# Patient Record
Sex: MALE | Race: WHITE | HISPANIC OR LATINO | Employment: OTHER | URBAN - METROPOLITAN AREA
[De-identification: names, ages, dates, MRNs, and addresses within clinical notes are randomized per-mention and may not be internally consistent; named-entity substitution may affect disease eponyms.]

---

## 2017-06-17 ENCOUNTER — ALLSCRIPTS OFFICE VISIT (OUTPATIENT)
Dept: OTHER | Facility: OTHER | Age: 57
End: 2017-06-17

## 2017-06-17 ENCOUNTER — APPOINTMENT (OUTPATIENT)
Dept: LAB | Facility: CLINIC | Age: 57
End: 2017-06-17
Payer: COMMERCIAL

## 2017-06-17 DIAGNOSIS — Z12.5 ENCOUNTER FOR SCREENING FOR MALIGNANT NEOPLASM OF PROSTATE: ICD-10-CM

## 2017-06-17 DIAGNOSIS — Z87.19 PERSONAL HISTORY OF OTHER DISEASES OF THE DIGESTIVE SYSTEM: ICD-10-CM

## 2017-06-17 DIAGNOSIS — Z12.11 ENCOUNTER FOR SCREENING FOR MALIGNANT NEOPLASM OF COLON: ICD-10-CM

## 2017-06-17 LAB — PSA SERPL-MCNC: 1.2 NG/ML (ref 0–4)

## 2017-06-17 PROCEDURE — 86430 RHEUMATOID FACTOR TEST QUAL: CPT

## 2017-06-17 PROCEDURE — 36415 COLL VENOUS BLD VENIPUNCTURE: CPT

## 2017-06-17 PROCEDURE — 86235 NUCLEAR ANTIGEN ANTIBODY: CPT

## 2017-06-17 PROCEDURE — 86038 ANTINUCLEAR ANTIBODIES: CPT

## 2017-06-17 PROCEDURE — G0103 PSA SCREENING: HCPCS

## 2017-06-19 LAB
ENA SS-A AB SER-ACNC: <0.2 AI (ref 0–0.9)
ENA SS-B AB SER-ACNC: <0.2 AI (ref 0–0.9)
RHEUMATOID FACT SER QL LA: NEGATIVE
RYE IGE QN: NEGATIVE

## 2017-07-15 ENCOUNTER — ALLSCRIPTS OFFICE VISIT (OUTPATIENT)
Dept: OTHER | Facility: OTHER | Age: 57
End: 2017-07-15

## 2018-01-13 VITALS
TEMPERATURE: 98.2 F | BODY MASS INDEX: 35.22 KG/M2 | WEIGHT: 246 LBS | DIASTOLIC BLOOD PRESSURE: 82 MMHG | HEART RATE: 78 BPM | HEIGHT: 70 IN | SYSTOLIC BLOOD PRESSURE: 160 MMHG | OXYGEN SATURATION: 97 %

## 2018-01-15 VITALS
WEIGHT: 246.25 LBS | BODY MASS INDEX: 35.25 KG/M2 | HEIGHT: 70 IN | SYSTOLIC BLOOD PRESSURE: 140 MMHG | HEART RATE: 74 BPM | OXYGEN SATURATION: 97 % | DIASTOLIC BLOOD PRESSURE: 82 MMHG | TEMPERATURE: 98 F
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
## 2018-02-16 RX ORDER — VALSARTAN 320 MG/1
1 TABLET ORAL DAILY
COMMUNITY
Start: 2017-06-17 | End: 2018-02-17

## 2018-02-16 RX ORDER — AMLODIPINE BESYLATE 5 MG/1
1 TABLET ORAL DAILY
COMMUNITY
Start: 2016-11-05 | End: 2018-03-08 | Stop reason: SDUPTHER

## 2018-02-17 ENCOUNTER — OFFICE VISIT (OUTPATIENT)
Dept: INTERNAL MEDICINE CLINIC | Facility: CLINIC | Age: 58
End: 2018-02-17
Payer: COMMERCIAL

## 2018-02-17 ENCOUNTER — TRANSCRIBE ORDERS (OUTPATIENT)
Dept: LAB | Facility: CLINIC | Age: 58
End: 2018-02-17

## 2018-02-17 ENCOUNTER — APPOINTMENT (OUTPATIENT)
Dept: LAB | Facility: CLINIC | Age: 58
End: 2018-02-17
Payer: COMMERCIAL

## 2018-02-17 VITALS
HEART RATE: 85 BPM | RESPIRATION RATE: 18 BRPM | DIASTOLIC BLOOD PRESSURE: 90 MMHG | BODY MASS INDEX: 37.19 KG/M2 | HEIGHT: 70 IN | OXYGEN SATURATION: 96 % | SYSTOLIC BLOOD PRESSURE: 148 MMHG | WEIGHT: 259.8 LBS | TEMPERATURE: 97.8 F

## 2018-02-17 DIAGNOSIS — I10 ESSENTIAL HYPERTENSION: Primary | ICD-10-CM

## 2018-02-17 DIAGNOSIS — I10 ESSENTIAL HYPERTENSION: ICD-10-CM

## 2018-02-17 DIAGNOSIS — IMO0002 ELEVATION OF LEVEL OF TRANSAMINASE OR LACTIC ACID DEHYDROGENASE (LDH): ICD-10-CM

## 2018-02-17 DIAGNOSIS — E78.2 COMBINED HYPERLIPIDEMIA: ICD-10-CM

## 2018-02-17 DIAGNOSIS — G62.9 NEUROPATHY: ICD-10-CM

## 2018-02-17 DIAGNOSIS — I11.9 HYPERTENSIVE HEART DISEASE WITHOUT HEART FAILURE: ICD-10-CM

## 2018-02-17 LAB
ALBUMIN SERPL BCP-MCNC: 4 G/DL (ref 3.5–5)
ALP SERPL-CCNC: 86 U/L (ref 46–116)
ALT SERPL W P-5'-P-CCNC: 88 U/L (ref 12–78)
ANION GAP SERPL CALCULATED.3IONS-SCNC: 7 MMOL/L (ref 4–13)
AST SERPL W P-5'-P-CCNC: 41 U/L (ref 5–45)
BASOPHILS # BLD AUTO: 0.03 THOUSANDS/ΜL (ref 0–0.1)
BASOPHILS NFR BLD AUTO: 1 % (ref 0–1)
BILIRUB SERPL-MCNC: 0.68 MG/DL (ref 0.2–1)
BILIRUB UR QL STRIP: NEGATIVE
BUN SERPL-MCNC: 14 MG/DL (ref 5–25)
CALCIUM SERPL-MCNC: 8.7 MG/DL (ref 8.3–10.1)
CHLORIDE SERPL-SCNC: 107 MMOL/L (ref 100–108)
CHOLEST SERPL-MCNC: 205 MG/DL (ref 50–200)
CLARITY UR: CLEAR
CO2 SERPL-SCNC: 27 MMOL/L (ref 21–32)
COLOR UR: YELLOW
CREAT SERPL-MCNC: 0.88 MG/DL (ref 0.6–1.3)
EOSINOPHIL # BLD AUTO: 0.26 THOUSAND/ΜL (ref 0–0.61)
EOSINOPHIL NFR BLD AUTO: 4 % (ref 0–6)
ERYTHROCYTE [DISTWIDTH] IN BLOOD BY AUTOMATED COUNT: 13.2 % (ref 11.6–15.1)
GFR SERPL CREATININE-BSD FRML MDRD: 96 ML/MIN/1.73SQ M
GLUCOSE SERPL-MCNC: 101 MG/DL (ref 65–140)
GLUCOSE UR STRIP-MCNC: NEGATIVE MG/DL
HCT VFR BLD AUTO: 44.6 % (ref 36.5–49.3)
HDLC SERPL-MCNC: 37 MG/DL (ref 40–60)
HGB BLD-MCNC: 15.2 G/DL (ref 12–17)
HGB UR QL STRIP.AUTO: NEGATIVE
KETONES UR STRIP-MCNC: NEGATIVE MG/DL
LEUKOCYTE ESTERASE UR QL STRIP: NEGATIVE
LYMPHOCYTES # BLD AUTO: 1.62 THOUSANDS/ΜL (ref 0.6–4.47)
LYMPHOCYTES NFR BLD AUTO: 28 % (ref 14–44)
MCH RBC QN AUTO: 30.6 PG (ref 26.8–34.3)
MCHC RBC AUTO-ENTMCNC: 34.1 G/DL (ref 31.4–37.4)
MCV RBC AUTO: 90 FL (ref 82–98)
MONOCYTES # BLD AUTO: 0.6 THOUSAND/ΜL (ref 0.17–1.22)
MONOCYTES NFR BLD AUTO: 10 % (ref 4–12)
NEUTROPHILS # BLD AUTO: 3.34 THOUSANDS/ΜL (ref 1.85–7.62)
NEUTS SEG NFR BLD AUTO: 57 % (ref 43–75)
NITRITE UR QL STRIP: NEGATIVE
NONHDLC SERPL-MCNC: 168 MG/DL
NRBC BLD AUTO-RTO: 0 /100 WBCS
PH UR STRIP.AUTO: 6 [PH] (ref 4.5–8)
PLATELET # BLD AUTO: 257 THOUSANDS/UL (ref 149–390)
PMV BLD AUTO: 9.7 FL (ref 8.9–12.7)
POTASSIUM SERPL-SCNC: 3.8 MMOL/L (ref 3.5–5.3)
PROT SERPL-MCNC: 7.6 G/DL (ref 6.4–8.2)
PROT UR STRIP-MCNC: NEGATIVE MG/DL
RBC # BLD AUTO: 4.96 MILLION/UL (ref 3.88–5.62)
SODIUM SERPL-SCNC: 141 MMOL/L (ref 136–145)
SP GR UR STRIP.AUTO: 1.02 (ref 1–1.03)
TSH SERPL DL<=0.05 MIU/L-ACNC: 1.3 UIU/ML (ref 0.36–3.74)
UROBILINOGEN UR QL STRIP.AUTO: 0.2 E.U./DL
WBC # BLD AUTO: 5.86 THOUSAND/UL (ref 4.31–10.16)

## 2018-02-17 PROCEDURE — 80053 COMPREHEN METABOLIC PANEL: CPT

## 2018-02-17 PROCEDURE — 83718 ASSAY OF LIPOPROTEIN: CPT

## 2018-02-17 PROCEDURE — 85025 COMPLETE CBC W/AUTO DIFF WBC: CPT

## 2018-02-17 PROCEDURE — 82465 ASSAY BLD/SERUM CHOLESTEROL: CPT

## 2018-02-17 PROCEDURE — 99213 OFFICE O/P EST LOW 20 MIN: CPT | Performed by: INTERNAL MEDICINE

## 2018-02-17 PROCEDURE — 81003 URINALYSIS AUTO W/O SCOPE: CPT | Performed by: INTERNAL MEDICINE

## 2018-02-17 PROCEDURE — 36415 COLL VENOUS BLD VENIPUNCTURE: CPT

## 2018-02-17 PROCEDURE — 84443 ASSAY THYROID STIM HORMONE: CPT

## 2018-02-17 RX ORDER — VALSARTAN AND HYDROCHLOROTHIAZIDE 320; 25 MG/1; MG/1
1 TABLET, FILM COATED ORAL DAILY
Qty: 90 TABLET | Refills: 3 | Status: SHIPPED | OUTPATIENT
Start: 2018-02-17 | End: 2019-01-23 | Stop reason: SDUPTHER

## 2018-02-17 NOTE — PATIENT INSTRUCTIONS
Blood pressure control needs to be improved  Discontinue valsartan and begin valsartan hydrochlorothiazide  Continue amlodipine  Laboratory testing today  Revisit in 2-3 months    Referral to Neurology regarding what sounds like a neurological injury of the hand
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
## 2018-03-08 DIAGNOSIS — I10 ESSENTIAL HYPERTENSION: Primary | ICD-10-CM

## 2018-03-08 RX ORDER — AMLODIPINE BESYLATE 5 MG/1
TABLET ORAL
Qty: 90 TABLET | Refills: 0 | Status: SHIPPED | OUTPATIENT
Start: 2018-03-08 | End: 2018-06-23 | Stop reason: SDUPTHER

## 2018-03-17 ENCOUNTER — HOSPITAL ENCOUNTER (EMERGENCY)
Facility: HOSPITAL | Age: 58
Discharge: HOME | End: 2018-03-17
Payer: COMMERCIAL

## 2018-03-17 VITALS
BODY MASS INDEX: 36.73 KG/M2 | OXYGEN SATURATION: 97 % | RESPIRATION RATE: 16 BRPM | SYSTOLIC BLOOD PRESSURE: 157 MMHG | HEART RATE: 77 BPM | WEIGHT: 256 LBS | TEMPERATURE: 98 F | DIASTOLIC BLOOD PRESSURE: 72 MMHG

## 2018-03-17 DIAGNOSIS — S61.019A THUMB LACERATION: Primary | ICD-10-CM

## 2018-03-17 PROCEDURE — 99282 EMERGENCY DEPT VISIT SF MDM: CPT

## 2018-03-17 RX ORDER — ACETAMINOPHEN AND CODEINE PHOSPHATE 300; 30 MG/1; MG/1
1 TABLET ORAL EVERY 6 HOURS PRN
Qty: 20 TABLET | Refills: 0 | Status: SHIPPED | OUTPATIENT
Start: 2018-03-17 | End: 2018-03-22

## 2018-03-17 NOTE — DISCHARGE INSTRUCTIONS
Laceration Without Closure   WHAT YOU NEED TO KNOW:   Your laceration has gone past the time to be closed  Lacerations in areas of poor blood flow usually need to be closed within 8 hours  In areas with normal blood flow, lacerations usually need to be closed within 12 hours  Facial lacerations need to be closed within 24 hours  Your laceration has been cleaned and a dressing has been applied  Your laceration will heal on its own without sutures, staples, or other closure devices  DISCHARGE INSTRUCTIONS:   Return to the emergency department if:   · You have redness, pain, or fever that gets worse quickly  · Your wound has a bad smell or has pus draining from it  · You have bleeding that does not stop after 10 minutes of holding firm, direct pressure on your wound  Contact your healthcare provider if:  You have questions or concerns about your condition or care  Wound care:   · Keep the wound dry for the first 24 to 48 hours  or as directed  Wash your hands with soap and warm water before and after you care for your wound  After that, gently clean the wound once or twice a day with cool water  Use soap to clean around the wound, but try not to get any on the wound edges  Do not use alcohol or hydrogen peroxide to clean your wound unless you are directed to do so  · Leave your bandage on as long as directed  Bandages keep your wound clean and protected  They can also prevent swelling  Ask how to change and how often to change your bandage  Ask if you should apply antibacterial ointment  Be careful not to wrap the bandage or tape too tightly  This could cut off blood flow and cause more injury  Change your bandages when they get wet or dirty  Follow up with your healthcare provider within 2 days or as directed:  Write down your questions so you remember to ask them during your visits    © 2017 [employer] Information is for End User's use only and may not be sold, redistributed or otherwise used for commercial purposes  All illustrations and images included in CareNotes® are the copyrighted property of [email or web address]  or [address]  The above information is an  only  It is not intended as medical advice for individual conditions or treatments  Talk to your doctor, nurse or pharmacist before following any medical regimen to see if it is safe and effective for you

## 2018-03-17 NOTE — ED PROVIDER NOTES
History  Chief Complaint   Patient presents with    Finger Laceration     pt sliced his right thumb when cutting potatoes  pt is up to date on his tetanus shot  81-year-old male patient presenting with laceration of his right thumb  He was cutting vegetables at home and accidentally caused a distal finger tip amputation removing the top layer of skin primarily  No deep tissue involvement tendon involvement no nail involvement  Current area is controlled with pressure no longer bleeding he has some avascular skin still attached which I will leave in place  He clean the wound at home and then he was cleaned again with saline here  This wound will only require dressing  Prior to Admission Medications   Prescriptions Last Dose Informant Patient Reported? Taking? amLODIPine (NORVASC) 5 mg tablet   No Yes   Sig: TAKE 1 TABLET BY MOUTH EVERY DAY   valsartan-hydrochlorothiazide (DIOVAN-HCT) 320-25 MG per tablet   No No   Sig: Take 1 tablet by mouth daily      Facility-Administered Medications: None       Past Medical History:   Diagnosis Date    Hypertension        History reviewed  No pertinent surgical history  Family History   Problem Relation Age of Onset    Breast cancer Mother      I have reviewed and agree with the history as documented  Social History   Substance Use Topics    Smoking status: Former Smoker    Smokeless tobacco: Never Used    Alcohol use 1 8 - 3 0 oz/week     3 - 5 Cans of beer per week      Comment: beers a day        Review of Systems   Constitutional: Negative for diaphoresis, fatigue and fever  HENT: Negative for congestion, ear pain, nosebleeds and sore throat  Eyes: Negative for photophobia, pain, discharge and visual disturbance  Respiratory: Negative for cough, choking, chest tightness, shortness of breath and wheezing  Cardiovascular: Negative for chest pain and palpitations     Gastrointestinal: Negative for abdominal distention, abdominal pain, diarrhea and vomiting  Genitourinary: Negative for dysuria, flank pain and frequency  Musculoskeletal: Negative for back pain, gait problem and joint swelling  Skin: Positive for wound  Negative for color change and rash  Neurological: Negative for dizziness, syncope and headaches  Psychiatric/Behavioral: Negative for behavioral problems and confusion  The patient is not nervous/anxious  All other systems reviewed and are negative  Physical Exam  ED Triage Vitals [03/17/18 1120]   Temperature Pulse Respirations Blood Pressure SpO2   98 °F (36 7 °C) 77 16 157/72 97 %      Temp Source Heart Rate Source Patient Position - Orthostatic VS BP Location FiO2 (%)   Oral Monitor Sitting Left arm --      Pain Score       Worst Possible Pain           Orthostatic Vital Signs  Vitals:    03/17/18 1120   BP: 157/72   Pulse: 77   Patient Position - Orthostatic VS: Sitting       Physical Exam   Constitutional: He is oriented to person, place, and time  He appears well-developed and well-nourished  HENT:   Head: Normocephalic and atraumatic  Eyes: Pupils are equal, round, and reactive to light  Neck: Normal range of motion  Neck supple  Cardiovascular: Normal rate, regular rhythm, normal heart sounds and normal pulses  PMI is not displaced  Pulmonary/Chest: Effort normal and breath sounds normal  No respiratory distress  Abdominal: Soft  He exhibits no distension  There is no guarding  Musculoskeletal: Normal range of motion  Lymphadenopathy:     He has no cervical adenopathy  Neurological: He is alert and oriented to person, place, and time  Skin: Skin is warm and dry  No rash noted  He is not diaphoretic  No pallor  There is distal finger tip laceration avulsion  Approximate  Annular shaped laceration avulsion is 1 cm  Psychiatric: He has a normal mood and affect  Vitals reviewed        ED Medications  Medications - No data to display    Diagnostic Studies  Results Reviewed     None No orders to display              Procedures  Procedures       Phone Contacts  ED Phone Contact    ED Course  ED Course                                MDM  Number of Diagnoses or Management Options  Thumb laceration: new and requires workup  Diagnosis management comments: Patient's wound was cleaned effectively  Then surgeon foam was applied to the wound this wound was then dressed with Telfa, tube gauze, and Coban  Patient Progress  Patient progress: improved    CritCare Time    Disposition  Final diagnoses:   Thumb laceration     Time reflects when diagnosis was documented in both MDM as applicable and the Disposition within this note     Time User Action Codes Description Comment    3/17/2018  1:13 PM Espiridion Renée Add [S61 012A] Thumb laceration, left, initial encounter     3/17/2018  1:13 PM Espiridion Renée Remove [S61 012A] Thumb laceration, left, initial encounter     3/17/2018  1:14 PM Espiridion Renée Add [J87 [id number]] Thumb laceration       ED Disposition     ED Disposition Condition Comment    Discharge  Ellaree Shoe discharge to home/self care      Condition at discharge: Good        Follow-up Information     Follow up With Specialties Details Why Contact Info    Shon Baltazar MD Internal Medicine Schedule an appointment as soon as possible for a visit For Continued Evaluation [address]  [phone]          Discharge Medication List as of 3/17/2018  1:18 PM      START taking these medications    Details   acetaminophen-codeine (TYLENOL #3) 300-30 mg per tablet Take 1 tablet by mouth every 6 (six) hours as needed for moderate pain for up to 5 days, Starting Sat 3/17/2018, Until Thu 3/22/2018, Print         CONTINUE these medications which have NOT CHANGED    Details   amLODIPine (NORVASC) 5 mg tablet TAKE 1 TABLET BY MOUTH EVERY DAY, Normal      valsartan-hydrochlorothiazide (DIOVAN-HCT) 320-25 MG per tablet Take 1 tablet by mouth daily, Starting Sat 2/17/2018, Normal           No discharge procedures on file      ED Provider  Electronically Signed by           Wesley Harris  03/18/18 8168

## 2018-04-10 ENCOUNTER — OFFICE VISIT (OUTPATIENT)
Dept: NEUROLOGY | Facility: CLINIC | Age: 58
End: 2018-04-10
Payer: COMMERCIAL

## 2018-04-10 VITALS
HEIGHT: 70 IN | HEART RATE: 84 BPM | BODY MASS INDEX: 37.22 KG/M2 | WEIGHT: 260 LBS | SYSTOLIC BLOOD PRESSURE: 126 MMHG | DIASTOLIC BLOOD PRESSURE: 70 MMHG

## 2018-04-10 DIAGNOSIS — M79.642 HAND PAIN, LEFT: Primary | ICD-10-CM

## 2018-04-10 DIAGNOSIS — G62.9 NEUROPATHY: ICD-10-CM

## 2018-04-10 PROCEDURE — 99244 OFF/OP CNSLTJ NEW/EST MOD 40: CPT | Performed by: PSYCHIATRY & NEUROLOGY

## 2018-04-10 RX ORDER — GABAPENTIN 100 MG/1
CAPSULE ORAL
Qty: 100 CAPSULE | Refills: 5 | Status: SHIPPED | OUTPATIENT
Start: 2018-04-10 | End: 2018-07-07

## 2018-04-10 RX ORDER — VALSARTAN 320 MG/1
1 TABLET ORAL DAILY
COMMUNITY
Start: 2018-03-02 | End: 2019-08-13

## 2018-04-10 NOTE — PROGRESS NOTES
Juan Gonzalez is a 66 y o  male  Who presents with complaints of left hand pain    Assessment:  1  Hand pain, left    2  Neuropathy        Plan:   MRI left hand  EMG left upper extremity  Gabapentin 1-3 pills 3 times daily as directed  Follow-up 6 weeks    Discussion:   symptoms of pain and numbness in the left hand following trauma, rule out reflex sympathetic dystrophy versus median neuropathy  Will obtain MRI of the hand, EMG of the left upper extremity and initiate gabapentin  As he is sensitive to medicine he will start 1 at bedtime and gradually increase up to a 1 pill 3 times daily  If this is not effective he may gradually increase the dose up to 3 pills 3 times daily  Potential adverse effects of meds in were discussed      Subjective:    HPI   Jabari Haskins reports that sometime in August of last year he was using a   He states the nausea 0 head blue off of the washer and struck the palm of his left hand  He states that the whole hand immediately went numb and was painful  He initially thought that he blew is hand off but when he looked he saw that structure that looked fine but was severely numb  He states after a few hours the numbness resolved but has been left with symptoms of intermittent tingling numbness in the fingers as well as hypersensitivity and pain in the hand  He states that if he rubs the palm of his hand lightly it is very sensitive like sunburn  He for rubs at roughly he does not get the same sensation  He states that he often notes an achy type pain in the whole palm both dorsal and ventral associated with use of the hand  This limits the use  He states he often uses a glove to minimize the symptoms when he is working  He off so notes that there is a little bit of tremulousness to the hand  He has not noticed any change in temperature or significant swelling  He states that he does not feel that he sweats as much in his left hand relative to the right    He had bilateral carpal tunnel release surgery performed about 20 years ago  He states most of the numbness is in the index and middle finger as well as his pinky  This is intermittent in nature  He does not feel that his symptoms have changed much since the injury  Past Medical History:   Diagnosis Date    Hand injury 08/2017    left    Hyperlipidemia     Hypertension     Neuropathy        Family History:  Family History   Problem Relation Age of Onset    Breast cancer Mother     ALS Father        Past Surgical History:  Past Surgical History:   Procedure Laterality Date    NO PAST SURGERIES         Social History:   reports that he has quit smoking  He has never used smokeless tobacco  He reports that he drinks about 1 8 - 3 0 oz of alcohol per week   He reports that he does not use drugs  Allergies:  Nsaids; Penicillins; and Ibuprofen      Current Outpatient Prescriptions:     amLODIPine (NORVASC) 5 mg tablet, TAKE 1 TABLET BY MOUTH EVERY DAY, Disp: 90 tablet, Rfl: 0    valsartan-hydrochlorothiazide (DIOVAN-HCT) 320-25 MG per tablet, Take 1 tablet by mouth daily, Disp: 90 tablet, Rfl: 3    gabapentin (NEURONTIN) 100 mg capsule, 1-3 pills t i d  As directed, Disp: 100 capsule, Rfl: 5    valsartan (DIOVAN) 320 MG tablet, Take 1 tablet by mouth daily, Disp: , Rfl:      I have reviewed the past medical, social and family history, current medications, allergies, vitals, review of systems and updated this information as appropriate today     Objective:    Vitals:  Blood pressure 126/70, pulse 84, height 5' 10" (1 778 m), weight 118 kg (260 lb)  Physical Exam    Neurological Exam    GENERAL:  Cooperative in no acute distress  Well-developed and well-nourished    HEAD and NECK   Head is atraumatic normocephalic with no lesions or masses  Neck is supple with full range of motion    CARDIOVASCULAR  Carotid Arteries-no carotid bruits      Extremities:  Mildly positive Tinel's is noted at the right median nerve where significantly positive Tinel's is noted over the left with a positive Phalen's maneuver on the left  No significant edema changes are noted in the left hand relative to the right, no obvious skin color changes or temperature changes  Allodynia is noted    NEUROLOGIC:  Mental Status-the patient is awake alert and oriented without aphasia or apraxia  Cranial Nerves: Visual fields are full to confrontation  Discs are flat  Extraocular movements are full without nystagmus  Pupils are 2-1/2 mm and reactive  Face is symmetrical to light touch  Movements of facial expression move symmetrically  Hearing is normal to finger rub bilaterally  Soft palate lifts symmetrically  Shoulder shrug is symmetrical  Tongue is midline without atrophy  Motor: No drift is noted on arm extension  Strength is full in the upper and lower extremities with normal bulk and tone  Sensory: Intact to temperature and vibratory sensation in the upper and lower extremities bilaterally  Cortical function is intact  Coordination: Finger to nose testing is performed accurately  Romberg is negative  Gait reveals a normal base with symmetrical arm swing  Tandem walk is normal   Reflexes:   1+ and symmetrical in the biceps, triceps, brachioradialis, knee jerk and ankle jerk regions Toes are downgoing            ROS:    Review of Systems   Constitutional: Negative for fatigue and fever  HENT: Positive for sinus pain and tinnitus  Negative for trouble swallowing  Eyes: Negative for pain  Respiratory: Positive for shortness of breath  Negative for choking  Cardiovascular: Negative for chest pain  Gastrointestinal: Negative for abdominal pain, constipation and diarrhea  Endocrine: Negative for polydipsia  Genitourinary: Negative for difficulty urinating  Musculoskeletal: Positive for back pain  Negative for myalgias and neck pain  Skin: Negative  Neurological: Positive for numbness   Negative for dizziness, weakness and headaches  Left hand twitching   Hematological: Does not bruise/bleed easily  Psychiatric/Behavioral: Negative for sleep disturbance

## 2018-04-18 ENCOUNTER — TRANSCRIBE ORDERS (OUTPATIENT)
Dept: MRI IMAGING | Facility: CLINIC | Age: 58
End: 2018-04-18

## 2018-06-23 DIAGNOSIS — I10 ESSENTIAL HYPERTENSION: ICD-10-CM

## 2018-06-25 DIAGNOSIS — I10 ESSENTIAL HYPERTENSION: ICD-10-CM

## 2018-06-25 RX ORDER — AMLODIPINE BESYLATE 5 MG/1
5 TABLET ORAL DAILY
Qty: 90 TABLET | Refills: 0 | Status: SHIPPED | OUTPATIENT
Start: 2018-06-25 | End: 2018-07-25 | Stop reason: SDUPTHER

## 2018-06-25 RX ORDER — AMLODIPINE BESYLATE 5 MG/1
TABLET ORAL
Qty: 90 TABLET | Refills: 0 | Status: SHIPPED | OUTPATIENT
Start: 2018-06-25 | End: 2018-06-25 | Stop reason: SDUPTHER

## 2018-07-07 ENCOUNTER — OFFICE VISIT (OUTPATIENT)
Dept: INTERNAL MEDICINE CLINIC | Facility: CLINIC | Age: 58
End: 2018-07-07

## 2018-07-07 VITALS
DIASTOLIC BLOOD PRESSURE: 82 MMHG | HEIGHT: 70 IN | OXYGEN SATURATION: 95 % | HEART RATE: 91 BPM | BODY MASS INDEX: 36.51 KG/M2 | SYSTOLIC BLOOD PRESSURE: 140 MMHG | WEIGHT: 255 LBS

## 2018-07-07 DIAGNOSIS — B02.9 HERPES ZOSTER WITHOUT COMPLICATION: Primary | ICD-10-CM

## 2018-07-07 PROCEDURE — 99214 OFFICE O/P EST MOD 30 MIN: CPT | Performed by: INTERNAL MEDICINE

## 2018-07-07 RX ORDER — OXYCODONE HYDROCHLORIDE 5 MG/1
5 TABLET ORAL EVERY 4 HOURS PRN
Qty: 60 TABLET | Refills: 0 | Status: SHIPPED | OUTPATIENT
Start: 2018-07-07 | End: 2018-07-24 | Stop reason: SDUPTHER

## 2018-07-07 RX ORDER — FAMCICLOVIR 500 MG/1
500 TABLET, FILM COATED ORAL 3 TIMES DAILY
Qty: 21 TABLET | Refills: 1 | Status: SHIPPED | OUTPATIENT
Start: 2018-07-07 | End: 2018-10-23 | Stop reason: SDUPTHER

## 2018-07-07 RX ORDER — GABAPENTIN 300 MG/1
300 CAPSULE ORAL 3 TIMES DAILY
Qty: 45 CAPSULE | Refills: 1 | Status: SHIPPED | OUTPATIENT
Start: 2018-07-07 | End: 2018-07-18 | Stop reason: SDUPTHER

## 2018-07-07 RX ORDER — DOCUSATE SODIUM 100 MG/1
100 CAPSULE, LIQUID FILLED ORAL 2 TIMES DAILY
Qty: 10 CAPSULE | Refills: 0 | Status: SHIPPED | OUTPATIENT
Start: 2018-07-07 | End: 2018-12-11

## 2018-07-07 NOTE — PROGRESS NOTES
Assessment/Plan:       Diagnoses and all orders for this visit:    Herpes zoster without complication              Subjective:      Patient ID: Barry Alfonso is a 66 y o  male  Abrupt onset of a painful vesicular rash in a dermatomal distribution extending from the left paravertebral region under the axilla and around to the chest wall distribution appears to be T1-T2    Prior to this, he had pain in this distribution for a week with no rash    He also had a kind of pain extending from the left paralumbar region down in a sciatic distribution but right now he has no rash there  This pain is better    No systemic symptoms        The following portions of the patient's history were reviewed and updated as appropriate:   He has a past medical history of Hand injury (08/2017); Hyperlipidemia; Hypertension; and Neuropathy  ,   does not have any pertinent problems on file  ,   has a past surgical history that includes No past surgeries  ,  family history includes ALS in his father; Breast cancer in his mother  ,   reports that he has quit smoking  He has never used smokeless tobacco  He reports that he drinks about 1 8 - 3 0 oz of alcohol per week   He reports that he does not use drugs  ,  is allergic to nsaids; penicillins; and ibuprofen     Current Outpatient Prescriptions   Medication Sig Dispense Refill    amLODIPine (NORVASC) 5 mg tablet Take 1 tablet (5 mg total) by mouth daily 90 tablet 0    gabapentin (NEURONTIN) 100 mg capsule 1-3 pills t i d  As directed 100 capsule 5    valsartan (DIOVAN) 320 MG tablet Take 1 tablet by mouth daily      valsartan-hydrochlorothiazide (DIOVAN-HCT) 320-25 MG per tablet Take 1 tablet by mouth daily 90 tablet 3     No current facility-administered medications for this visit  Review of Systems   Constitutional: Negative for activity change  Respiratory: Negative for cough, shortness of breath and wheezing  Cardiovascular: Negative for chest pain     Skin: Positive for rash    Neurological:        Radiculopathic pain as described         Objective:  Vitals:    07/07/18 0911   BP: 140/82   Pulse: 91   SpO2: 95%      Physical Exam   Constitutional: He appears well-developed and well-nourished  He appears distressed  Alert patient verbalizing complaint of pain in some distress from the pain but not tachypneic, not dyspneic, and not using accessory muscles  Pulmonary/Chest: Effort normal  No respiratory distress  Skin: Rash noted     Dermatomal rash as described

## 2018-07-07 NOTE — PATIENT INSTRUCTIONS
Acute episode of shingles  Famciclovir 500 mg 3 times a day x1 week    Gabapentin 3 him mg 3 times a day to try to control pain  Oxycodone 5 mg 1-2 tablets as needed every 4 hours for breakthrough pain    Colace 100 mg twice a day to try to prevent opioid induced constipation  Stay out of work  Risk of this is drowsiness from the medications

## 2018-07-18 ENCOUNTER — TELEPHONE (OUTPATIENT)
Dept: INTERNAL MEDICINE CLINIC | Facility: CLINIC | Age: 58
End: 2018-07-18

## 2018-07-18 DIAGNOSIS — B02.9 HERPES ZOSTER WITHOUT COMPLICATION: ICD-10-CM

## 2018-07-18 RX ORDER — GABAPENTIN 300 MG/1
300 CAPSULE ORAL 4 TIMES DAILY
Qty: 120 CAPSULE | Refills: 3 | Status: SHIPPED | OUTPATIENT
Start: 2018-07-18 | End: 2018-10-31 | Stop reason: SDUPTHER

## 2018-07-18 NOTE — TELEPHONE ENCOUNTER
PATIENT HAS SHINGLES  PATIENT TAKING GABAPENTIN 300MG TID  WIFE CALLED AND SAID THAT HE IS TAKING 4 PILLS PER DAY SO SHE IS ASKING IF DR CAN WRITE THE SCRIPT FOR GABAPENTIN 300MG , TAKING 4 TIMES PER DAY    HER # - [id number]  [facility]    [phone]

## 2018-07-24 ENCOUNTER — TELEPHONE (OUTPATIENT)
Dept: INTERNAL MEDICINE CLINIC | Facility: CLINIC | Age: 58
End: 2018-07-24

## 2018-07-24 DIAGNOSIS — B02.9 HERPES ZOSTER WITHOUT COMPLICATION: ICD-10-CM

## 2018-07-24 RX ORDER — OXYCODONE HYDROCHLORIDE 5 MG/1
5 TABLET ORAL EVERY 4 HOURS PRN
Qty: 60 TABLET | Refills: 0 | Status: SHIPPED | OUTPATIENT
Start: 2018-07-24 | End: 2019-08-13

## 2018-07-24 NOTE — TELEPHONE ENCOUNTER
He has shingles and was taking gabapentin and Oxy codone  5 mg   Ashok Oneill He stopped taking the oxy and saved 1 pill    But pain came back and is still very painful  He took the last oxy codone last night and was able to sleep through    But now again  Ashok Oneill He's feeling the pain and is asking if Dr would prescribe the oxy codone again, so he can get some relief    Please call pt and let him know what has been done

## 2018-07-25 DIAGNOSIS — I10 ESSENTIAL HYPERTENSION: ICD-10-CM

## 2018-07-25 RX ORDER — AMLODIPINE BESYLATE 5 MG/1
5 TABLET ORAL DAILY
Qty: 90 TABLET | Refills: 0 | Status: SHIPPED | OUTPATIENT
Start: 2018-07-25 | End: 2018-11-07 | Stop reason: SDUPTHER

## 2018-07-25 NOTE — TELEPHONE ENCOUNTER
NEEDS  AMLODIPINE 5MG QD #90  INSURANCE REQUIRES A 90 DAY SUPPLY  [facility]  [phone]

## 2018-10-23 ENCOUNTER — OFFICE VISIT (OUTPATIENT)
Dept: INTERNAL MEDICINE CLINIC | Facility: CLINIC | Age: 58
End: 2018-10-23

## 2018-10-23 VITALS
HEART RATE: 88 BPM | DIASTOLIC BLOOD PRESSURE: 80 MMHG | OXYGEN SATURATION: 97 % | SYSTOLIC BLOOD PRESSURE: 130 MMHG | TEMPERATURE: 98 F

## 2018-10-23 DIAGNOSIS — G62.9 NEUROPATHY: Primary | ICD-10-CM
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
## 2018-10-23 DIAGNOSIS — B02.9 HERPES ZOSTER WITHOUT COMPLICATION: ICD-10-CM

## 2018-10-23 PROCEDURE — 99213 OFFICE O/P EST LOW 20 MIN: CPT | Performed by: INTERNAL MEDICINE

## 2018-10-23 RX ORDER — DIPHENOXYLATE HYDROCHLORIDE AND ATROPINE SULFATE 2.5; .025 MG/1; MG/1
1 TABLET ORAL DAILY
COMMUNITY

## 2018-10-23 RX ORDER — DOXYCYCLINE HYCLATE 100 MG/1
100 CAPSULE ORAL EVERY 12 HOURS SCHEDULED
Qty: 2 CAPSULE | Refills: 0 | Status: SHIPPED | OUTPATIENT
Start: 2018-10-23 | End: 2018-10-24

## 2018-10-23 RX ORDER — FAMCICLOVIR 500 MG/1
500 TABLET, FILM COATED ORAL 3 TIMES DAILY
Qty: 21 TABLET | Refills: 0 | Status: SHIPPED | OUTPATIENT
Start: 2018-10-23 | End: 2018-10-29

## 2018-10-23 RX ORDER — PREDNISONE 10 MG/1
TABLET ORAL
Qty: 30 TABLET | Refills: 0 | Status: SHIPPED | OUTPATIENT
Start: 2018-10-23 | End: 2019-08-13

## 2018-10-23 NOTE — PROGRESS NOTES
Assessment/Plan:       Diagnoses and all orders for this visit:    Neuropathy  -     predniSONE 10 mg tablet; 4 DAILY FOR 3 DAYS,3 DAILY FOR 3 DAYS,2 DAILY FOR 3 DAYS, ONE DAILY FOR 3 DAYS    Tick bite, initial encounter  -     doxycycline hyclate (VIBRAMYCIN) 100 mg capsule; Take 1 capsule (100 mg total) by mouth every 12 (twelve) hours for 1 dose    Herpes zoster without complication  -     predniSONE 10 mg tablet; 4 DAILY FOR 3 DAYS,3 DAILY FOR 3 DAYS,2 DAILY FOR 3 DAYS, ONE DAILY FOR 3 DAYS  -     famciclovir (FAMVIR) 500 mg tablet; Take 1 tablet (500 mg total) by mouth 3 (three) times a day for 7 days    Other orders  -     multivitamin (THERAGRAN) TABS; Take 1 tablet by mouth daily          Patient Instructions   Cervical radiculopathy which may be zoster  Treat with prednisone and famciclovir for a week  Tick bite-use doxycycline 2 capsules immediately for prevention of tick borne infection  Revisit here in 2 weeks  Subjective:      Patient ID: Moriah Botello is a 66 y o  male  A patient with a one-week history of radiculopathic pain felt in the right occipital region radiating down the posterolateral aspect of the right shoulder towards the elbow  This had happened before on the left and what seemed to be a cervical radiculopathy due to perhaps H and P turned out to be shingles where the rash occurred quite late  Please refer to the note of July 7  He does have associated sensation of hyperesthesia of both shoulders which has actually been ongoing  This has not really been addressed  No weakness  The patient found a tick on his body the other day after mowing the lawn  The following portions of the patient's history were reviewed and updated as appropriate:   He has a past medical history of Hyperlipidemia and Neuropathy  ,   does not have any pertinent problems on file  ,   has a past surgical history that includes No past surgeries  ,  family history includes ALS in his father; Breast cancer in his mother  ,   reports that he has quit smoking  He has never used smokeless tobacco  He reports that he drinks about 1 8 - 3 0 oz of alcohol per week   He reports that he does not use drugs  ,  is allergic to nsaids; penicillins; and ibuprofen     Current Outpatient Prescriptions   Medication Sig Dispense Refill    amLODIPine (NORVASC) 5 mg tablet Take 1 tablet (5 mg total) by mouth daily for 90 days 90 tablet 0    docusate sodium (COLACE) 100 mg capsule Take 1 capsule (100 mg total) by mouth 2 (two) times a day 10 capsule 0    gabapentin (NEURONTIN) 300 mg capsule Take 1 capsule (300 mg total) by mouth 4 (four) times a day 120 capsule 3    multivitamin (THERAGRAN) TABS Take 1 tablet by mouth daily      valsartan (DIOVAN) 320 MG tablet Take 1 tablet by mouth daily      valsartan-hydrochlorothiazide (DIOVAN-HCT) 320-25 MG per tablet Take 1 tablet by mouth daily 90 tablet 3    doxycycline hyclate (VIBRAMYCIN) 100 mg capsule Take 1 capsule (100 mg total) by mouth every 12 (twelve) hours for 1 dose 2 capsule 0    famciclovir (FAMVIR) 500 mg tablet Take 1 tablet (500 mg total) by mouth 3 (three) times a day for 7 days 21 tablet 0    oxyCODONE (ROXICODONE) 5 mg immediate release tablet Take 1 tablet (5 mg total) by mouth every 4 (four) hours as needed for moderate pain Max Daily Amount: 30 mg (Patient not taking: Reported on 10/23/2018 ) 60 tablet 0    predniSONE 10 mg tablet 4 DAILY FOR 3 DAYS,3 DAILY FOR 3 DAYS,2 DAILY FOR 3 DAYS, ONE DAILY FOR 3 DAYS 30 tablet 0     No current facility-administered medications for this visit  Review of Systems   Constitutional: Negative for fever  Skin: Positive for wound  Negative for rash  Neurological: Positive for numbness  Negative for weakness  Objective:  Vitals:    10/23/18 1721   BP: 130/80   Pulse: 88   Temp: 98 °F (36 7 °C)   SpO2: 97%      Physical Exam   Constitutional: He appears well-developed and well-nourished     Pleasant overweight male patient who appears to be stated age verbalizing above complaint   Skin:   Puncture wound right abdominal wall located in the right upper quadrant

## 2018-10-23 NOTE — PATIENT INSTRUCTIONS
Cervical radiculopathy which may be zoster  Treat with prednisone and famciclovir for a week  Tick bite-use doxycycline 2 capsules immediately for prevention of tick borne infection  Revisit here in 2 weeks

## 2018-10-29 ENCOUNTER — TELEPHONE (OUTPATIENT)
Dept: INTERNAL MEDICINE CLINIC | Facility: CLINIC | Age: 58
End: 2018-10-29

## 2018-10-29 DIAGNOSIS — B00.9 HERPES SIMPLEX TYPE 1 INFECTION: Primary | ICD-10-CM

## 2018-10-29 RX ORDER — ACYCLOVIR 50 MG/G
OINTMENT TOPICAL
Qty: 15 G | Refills: 5 | Status: SHIPPED | OUTPATIENT
Start: 2018-10-29 | End: 2018-12-11

## 2018-10-29 NOTE — TELEPHONE ENCOUNTER
Patient states he is having a bad reaction to Famciclovir (Famvir) 500 mg tablet  States he stopped taking it yesterday  He has pain in his side, feels agitated  Please advise if medication can be changed to something else   [phone]

## 2018-10-31 DIAGNOSIS — B02.9 HERPES ZOSTER WITHOUT COMPLICATION: ICD-10-CM

## 2018-10-31 RX ORDER — GABAPENTIN 300 MG/1
300 CAPSULE ORAL 4 TIMES DAILY
Qty: 120 CAPSULE | Refills: 0 | Status: SHIPPED | OUTPATIENT
Start: 2018-10-31 | End: 2018-11-02 | Stop reason: SDUPTHER

## 2018-11-02 DIAGNOSIS — B02.9 HERPES ZOSTER WITHOUT COMPLICATION: ICD-10-CM

## 2018-11-03 RX ORDER — GABAPENTIN 300 MG/1
300 CAPSULE ORAL 4 TIMES DAILY
Qty: 120 CAPSULE | Refills: 0 | Status: SHIPPED | OUTPATIENT
Start: 2018-11-03 | End: 2019-08-13

## 2018-11-07 DIAGNOSIS — I10 ESSENTIAL HYPERTENSION: ICD-10-CM

## 2018-11-07 RX ORDER — AMLODIPINE BESYLATE 5 MG/1
5 TABLET ORAL DAILY
Qty: 90 TABLET | Refills: 3 | Status: SHIPPED | OUTPATIENT
Start: 2018-11-07 | End: 2019-11-08

## 2018-12-11 ENCOUNTER — OFFICE VISIT (OUTPATIENT)
Dept: INTERNAL MEDICINE CLINIC | Facility: CLINIC | Age: 58
End: 2018-12-11

## 2018-12-11 ENCOUNTER — APPOINTMENT (OUTPATIENT)
Dept: LAB | Facility: CLINIC | Age: 58
End: 2018-12-11
Payer: COMMERCIAL

## 2018-12-11 VITALS
HEIGHT: 71 IN | WEIGHT: 257 LBS | DIASTOLIC BLOOD PRESSURE: 100 MMHG | SYSTOLIC BLOOD PRESSURE: 160 MMHG | BODY MASS INDEX: 35.98 KG/M2 | OXYGEN SATURATION: 97 % | HEART RATE: 76 BPM

## 2018-12-11 DIAGNOSIS — M12.9 ARTHROPATHY: ICD-10-CM

## 2018-12-11 DIAGNOSIS — G62.9 NEUROPATHY: Primary | ICD-10-CM

## 2018-12-11 DIAGNOSIS — B02.9 HERPES ZOSTER WITHOUT COMPLICATION: ICD-10-CM

## 2018-12-11 DIAGNOSIS — G62.9 NEUROPATHY: ICD-10-CM
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
## 2018-12-11 DIAGNOSIS — M48.02 CERVICAL SPINAL STENOSIS: ICD-10-CM

## 2018-12-11 LAB
ERYTHROCYTE [SEDIMENTATION RATE] IN BLOOD: 7 MM/HOUR (ref 0–10)
URATE SERPL-MCNC: 8.3 MG/DL (ref 4.2–8)

## 2018-12-11 PROCEDURE — 86038 ANTINUCLEAR ANTIBODIES: CPT

## 2018-12-11 PROCEDURE — 86618 LYME DISEASE ANTIBODY: CPT

## 2018-12-11 PROCEDURE — 84550 ASSAY OF BLOOD/URIC ACID: CPT

## 2018-12-11 PROCEDURE — 99212 OFFICE O/P EST SF 10 MIN: CPT | Performed by: INTERNAL MEDICINE

## 2018-12-11 PROCEDURE — 36415 COLL VENOUS BLD VENIPUNCTURE: CPT

## 2018-12-11 PROCEDURE — 86431 RHEUMATOID FACTOR QUANT: CPT

## 2018-12-11 PROCEDURE — 85652 RBC SED RATE AUTOMATED: CPT

## 2018-12-11 PROCEDURE — 86617 LYME DISEASE ANTIBODY: CPT

## 2018-12-11 PROCEDURE — 86430 RHEUMATOID FACTOR TEST QUAL: CPT

## 2018-12-11 RX ORDER — FAMCICLOVIR 500 MG/1
500 TABLET, FILM COATED ORAL 3 TIMES DAILY
Qty: 30 TABLET | Refills: 0 | Status: SHIPPED | OUTPATIENT
Start: 2018-12-11 | End: 2019-08-13

## 2018-12-11 NOTE — PATIENT INSTRUCTIONS
Persistent symptoms of neuropathy now with bilateral distribution in an area suggesting involvement of the cervical spine  Development of joint pain with observation of sausage shaped fingers suggesting neuropathy  Diagnostics will be laboratory testing to look for inflammatory joint markers, and MRI of the cervical spine to look for mass lesion , spinal stenosis    As the patient cannot tolerate gabapentin nor steroids, treatment options right now are  limited    [facility]  Neurological consultation

## 2018-12-11 NOTE — PROGRESS NOTES
Assessment/Plan:       Diagnoses and all orders for this visit:    Neuropathy  -     Ambulatory referral to Neurology; Future  -     famciclovir (FAMVIR) 500 mg tablet; Take 1 tablet (500 mg total) by mouth 3 (three) times a day for 10 days  -     EAN Screen w/ Reflex to Titer/Pattern; Future  -     Lyme Antibody Profile with reflex to WB; Future  -     RF Screen w/ Reflex to Titer; Future  -     Sedimentation rate, automated; Future  -     Uric acid; Future    Herpes zoster without complication  -     Ambulatory referral to Neurology; Future  -     famciclovir (FAMVIR) 500 mg tablet; Take 1 tablet (500 mg total) by mouth 3 (three) times a day for 10 days    Arthropathy  -     EAN Screen w/ Reflex to Titer/Pattern; Future  -     Lyme Antibody Profile with reflex to WB; Future  -     RF Screen w/ Reflex to Titer; Future  -     Sedimentation rate, automated; Future  -     Uric acid; Future    Tick bite, initial encounter          There are no Patient Instructions on file for this visit  Subjective:      Patient ID: Shefali Michel is a 66 y o  male  A 60-year-old man  Initially seen here in July for what seemed to be fairly clear-cut shingles  Treated with the usual package of antiviral and steroid  He had a rash distributed in the lower cervical distribution approximately C4-C5 overlying the left superior trapezius region  The rash resolved about 90% but he was left with persistent paresthesia  Now, over the past month, he has developed symptoms which are similar but are bilateral   He has a sensation of tingling of both sides of his face extending into his beard any also has the same sensation located bilaterally in the C4-C5 distribution of both shoulders  He has some discoloration there but no filomena rash      He is able to tolerate the famciclovir but last month I tried to given both gabapentin and prednisone again and he could not tolerate those drugs which gave him fatigue, irritability  Associated complaint of pain felt in the fingers along with some stiffness  This is been going on for number of months  The following portions of the patient's history were reviewed and updated as appropriate:   He has a past medical history of Hyperlipidemia and Neuropathy  ,   does not have any pertinent problems on file  ,   has a past surgical history that includes No past surgeries  ,  family history includes ALS in his father; Breast cancer in his mother  ,   reports that he has quit smoking  He has never used smokeless tobacco  He reports that he drinks about 1 8 - 3 0 oz of alcohol per week   He reports that he does not use drugs  ,  is allergic to nsaids; penicillins; and ibuprofen     Current Outpatient Prescriptions   Medication Sig Dispense Refill    amLODIPine (NORVASC) 5 mg tablet Take 1 tablet (5 mg total) by mouth daily for 90 days 90 tablet 3    multivitamin (THERAGRAN) TABS Take 1 tablet by mouth daily      valsartan (DIOVAN) 320 MG tablet Take 1 tablet by mouth daily      valsartan-hydrochlorothiazide (DIOVAN-HCT) 320-25 MG per tablet Take 1 tablet by mouth daily 90 tablet 3    famciclovir (FAMVIR) 500 mg tablet Take 1 tablet (500 mg total) by mouth 3 (three) times a day for 10 days 30 tablet 0    gabapentin (NEURONTIN) 300 mg capsule Take 1 capsule (300 mg total) by mouth 4 (four) times a day (Patient not taking: Reported on 12/11/2018 ) 120 capsule 0    oxyCODONE (ROXICODONE) 5 mg immediate release tablet Take 1 tablet (5 mg total) by mouth every 4 (four) hours as needed for moderate pain Max Daily Amount: 30 mg (Patient not taking: Reported on 10/23/2018 ) 60 tablet 0    predniSONE 10 mg tablet 4 DAILY FOR 3 DAYS,3 DAILY FOR 3 DAYS,2 DAILY FOR 3 DAYS, ONE DAILY FOR 3 DAYS (Patient not taking: Reported on 12/11/2018 ) 30 tablet 0     No current facility-administered medications for this visit  Review of Systems   Constitutional: Negative for fatigue and fever  Musculoskeletal: Positive for arthralgias and joint swelling  Neurological: Positive for numbness  Objective:  Vitals:    12/11/18 0946   BP: 160/100   Pulse: 76   SpO2: 97%      Physical Exam   Constitutional:   Alert somewhat overweight male patient who appears stated age   Cardiovascular: Normal rate  Pulmonary/Chest: Effort normal    Musculoskeletal: Normal range of motion  He exhibits deformity  Sausage shaped fingers   Skin: Skin is warm and dry     Discoloration overlying the left upper trapezius border consistent with prior vesicles  No visible vesicles or abnormal skin findings in the face and right side of the shoulder

## 2018-12-12 LAB
B BURGDOR IGG SER IA-ACNC: 1.01
B BURGDOR IGM SER IA-ACNC: 0.34
CRYOGLOB RF SER-ACNC: ABNORMAL [IU]/ML
RHEUMATOID FACT SER QL LA: POSITIVE
RYE IGE QN: NEGATIVE

## 2018-12-13 LAB
B BURGDOR IGG PATRN SER IB-IMP: NEGATIVE
B BURGDOR IGM PATRN SER IB-IMP: NEGATIVE
B BURGDOR18KD IGG SER QL IB: ABNORMAL
B BURGDOR23KD IGG SER QL IB: ABNORMAL
B BURGDOR23KD IGM SER QL IB: ABNORMAL
B BURGDOR28KD IGG SER QL IB: ABNORMAL
B BURGDOR30KD IGG SER QL IB: ABNORMAL
B BURGDOR39KD IGG SER QL IB: ABNORMAL
B BURGDOR39KD IGM SER QL IB: ABNORMAL
B BURGDOR41KD IGG SER QL IB: PRESENT
B BURGDOR41KD IGM SER QL IB: ABNORMAL
B BURGDOR45KD IGG SER QL IB: ABNORMAL
B BURGDOR58KD IGG SER QL IB: ABNORMAL
B BURGDOR66KD IGG SER QL IB: ABNORMAL
B BURGDOR93KD IGG SER QL IB: ABNORMAL

## 2019-01-23 DIAGNOSIS — I10 ESSENTIAL HYPERTENSION: ICD-10-CM

## 2019-01-23 RX ORDER — VALSARTAN AND HYDROCHLOROTHIAZIDE 320; 25 MG/1; MG/1
1 TABLET, FILM COATED ORAL DAILY
Qty: 90 TABLET | Refills: 2 | Status: SHIPPED | OUTPATIENT
Start: 2019-01-23 | End: 2019-10-28 | Stop reason: SDUPTHER

## 2019-08-13 ENCOUNTER — APPOINTMENT (OUTPATIENT)
Dept: LAB | Facility: CLINIC | Age: 59
End: 2019-08-13
Payer: COMMERCIAL

## 2019-08-13 ENCOUNTER — OFFICE VISIT (OUTPATIENT)
Dept: INTERNAL MEDICINE CLINIC | Facility: CLINIC | Age: 59
End: 2019-08-13
Payer: COMMERCIAL

## 2019-08-13 VITALS
DIASTOLIC BLOOD PRESSURE: 76 MMHG | SYSTOLIC BLOOD PRESSURE: 120 MMHG | OXYGEN SATURATION: 98 % | HEIGHT: 71 IN | BODY MASS INDEX: 34.86 KG/M2 | HEART RATE: 82 BPM | WEIGHT: 249 LBS

## 2019-08-13 DIAGNOSIS — R21 RASH: Primary | ICD-10-CM

## 2019-08-13 DIAGNOSIS — G44.52 NEW DAILY PERSISTENT HEADACHE: ICD-10-CM

## 2019-08-13 DIAGNOSIS — R21 RASH: ICD-10-CM

## 2019-08-13 DIAGNOSIS — M79.10 MYALGIA: ICD-10-CM
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
## 2019-08-13 LAB
ALBUMIN SERPL BCP-MCNC: 4.8 G/DL (ref 3.5–5)
ALP SERPL-CCNC: 93 U/L (ref 46–116)
ALT SERPL W P-5'-P-CCNC: 53 U/L (ref 12–78)
ANION GAP SERPL CALCULATED.3IONS-SCNC: 8 MMOL/L (ref 4–13)
AST SERPL W P-5'-P-CCNC: 30 U/L (ref 5–45)
BASOPHILS # BLD AUTO: 0.06 THOUSANDS/ΜL (ref 0–0.1)
BASOPHILS NFR BLD AUTO: 1 % (ref 0–1)
BILIRUB SERPL-MCNC: 0.68 MG/DL (ref 0.2–1)
BILIRUB UR QL STRIP: NEGATIVE
BUN SERPL-MCNC: 18 MG/DL (ref 5–25)
CALCIUM SERPL-MCNC: 9.9 MG/DL (ref 8.3–10.1)
CHLORIDE SERPL-SCNC: 105 MMOL/L (ref 100–108)
CK MB SERPL-MCNC: 3.3 NG/ML (ref 0–5)
CK MB SERPL-MCNC: <1 % (ref 0–2.5)
CK SERPL-CCNC: 356 U/L (ref 39–308)
CLARITY UR: CLEAR
CO2 SERPL-SCNC: 28 MMOL/L (ref 21–32)
COLOR UR: YELLOW
CREAT SERPL-MCNC: 0.96 MG/DL (ref 0.6–1.3)
EOSINOPHIL # BLD AUTO: 0.19 THOUSAND/ΜL (ref 0–0.61)
EOSINOPHIL NFR BLD AUTO: 3 % (ref 0–6)
ERYTHROCYTE [DISTWIDTH] IN BLOOD BY AUTOMATED COUNT: 12.8 % (ref 11.6–15.1)
ERYTHROCYTE [SEDIMENTATION RATE] IN BLOOD: 7 MM/HOUR (ref 0–10)
GFR SERPL CREATININE-BSD FRML MDRD: 87 ML/MIN/1.73SQ M
GLUCOSE P FAST SERPL-MCNC: 86 MG/DL (ref 65–99)
GLUCOSE UR STRIP-MCNC: NEGATIVE MG/DL
HCT VFR BLD AUTO: 44.6 % (ref 36.5–49.3)
HGB BLD-MCNC: 15.1 G/DL (ref 12–17)
HGB UR QL STRIP.AUTO: NEGATIVE
IMM GRANULOCYTES # BLD AUTO: 0.02 THOUSAND/UL (ref 0–0.2)
IMM GRANULOCYTES NFR BLD AUTO: 0 % (ref 0–2)
KETONES UR STRIP-MCNC: NEGATIVE MG/DL
LEUKOCYTE ESTERASE UR QL STRIP: NEGATIVE
LYMPHOCYTES # BLD AUTO: 1.56 THOUSANDS/ΜL (ref 0.6–4.47)
LYMPHOCYTES NFR BLD AUTO: 25 % (ref 14–44)
MCH RBC QN AUTO: 30.5 PG (ref 26.8–34.3)
MCHC RBC AUTO-ENTMCNC: 33.9 G/DL (ref 31.4–37.4)
MCV RBC AUTO: 90 FL (ref 82–98)
MONOCYTES # BLD AUTO: 0.7 THOUSAND/ΜL (ref 0.17–1.22)
MONOCYTES NFR BLD AUTO: 11 % (ref 4–12)
NEUTROPHILS # BLD AUTO: 3.63 THOUSANDS/ΜL (ref 1.85–7.62)
NEUTS SEG NFR BLD AUTO: 60 % (ref 43–75)
NITRITE UR QL STRIP: NEGATIVE
NRBC BLD AUTO-RTO: 0 /100 WBCS
PH UR STRIP.AUTO: 6 [PH]
PLATELET # BLD AUTO: 266 THOUSANDS/UL (ref 149–390)
PMV BLD AUTO: 10 FL (ref 8.9–12.7)
POTASSIUM SERPL-SCNC: 3.5 MMOL/L (ref 3.5–5.3)
PROT SERPL-MCNC: 8.2 G/DL (ref 6.4–8.2)
PROT UR STRIP-MCNC: NEGATIVE MG/DL
RBC # BLD AUTO: 4.95 MILLION/UL (ref 3.88–5.62)
SODIUM SERPL-SCNC: 141 MMOL/L (ref 136–145)
SP GR UR STRIP.AUTO: 1.01 (ref 1–1.03)
TSH SERPL DL<=0.05 MIU/L-ACNC: 1.65 UIU/ML (ref 0.36–3.74)
UROBILINOGEN UR QL STRIP.AUTO: 0.2 E.U./DL
WBC # BLD AUTO: 6.16 THOUSAND/UL (ref 4.31–10.16)

## 2019-08-13 PROCEDURE — 82553 CREATINE MB FRACTION: CPT

## 2019-08-13 PROCEDURE — 36415 COLL VENOUS BLD VENIPUNCTURE: CPT

## 2019-08-13 PROCEDURE — 85025 COMPLETE CBC W/AUTO DIFF WBC: CPT

## 2019-08-13 PROCEDURE — 86753 PROTOZOA ANTIBODY NOS: CPT

## 2019-08-13 PROCEDURE — 86617 LYME DISEASE ANTIBODY: CPT

## 2019-08-13 PROCEDURE — 85652 RBC SED RATE AUTOMATED: CPT

## 2019-08-13 PROCEDURE — 86618 LYME DISEASE ANTIBODY: CPT

## 2019-08-13 PROCEDURE — 80053 COMPREHEN METABOLIC PANEL: CPT

## 2019-08-13 PROCEDURE — 82085 ASSAY OF ALDOLASE: CPT

## 2019-08-13 PROCEDURE — 86666 EHRLICHIA ANTIBODY: CPT

## 2019-08-13 PROCEDURE — 86431 RHEUMATOID FACTOR QUANT: CPT

## 2019-08-13 PROCEDURE — 84443 ASSAY THYROID STIM HORMONE: CPT

## 2019-08-13 PROCEDURE — 81003 URINALYSIS AUTO W/O SCOPE: CPT | Performed by: INTERNAL MEDICINE

## 2019-08-13 PROCEDURE — 86038 ANTINUCLEAR ANTIBODIES: CPT

## 2019-08-13 PROCEDURE — 82550 ASSAY OF CK (CPK): CPT

## 2019-08-13 PROCEDURE — 99214 OFFICE O/P EST MOD 30 MIN: CPT | Performed by: INTERNAL MEDICINE

## 2019-08-13 PROCEDURE — 86039 ANTINUCLEAR ANTIBODIES (ANA): CPT

## 2019-08-13 PROCEDURE — 86430 RHEUMATOID FACTOR TEST QUAL: CPT

## 2019-08-13 NOTE — PATIENT INSTRUCTIONS
Patient with a constellation of symptoms and signs  Itchy and even tender rash of the scalp, forehead, and upper trunk and arms  New daily headache   Scalp tenderness to the point of allodynia    Workup to include laboratory testing with tick borne disease markers, brain MRI    Also sedimentation rate and other autoimmune markers  Further recommendations will depend upon results of these initial studies but Dermatology and neurology consult will be placed

## 2019-08-13 NOTE — PROGRESS NOTES
Assessment/Plan:       Diagnoses and all orders for this visit:    Rash  -     CBC and differential; Future  -     Comprehensive metabolic panel; Future  -     TSH, 3rd generation with Free T4 reflex; Future  -     Urinalysis with reflex to microscopic  -     EAN Screen w/ Reflex to Titer/Pattern; Future  -     Lyme Antibody Profile with reflex to WB; Future  -     RF Screen w/ Reflex to Titer; Future  -     Sedimentation rate, automated; Future  -     Babesia microti antibody, IgG & Igm; Future  -     Ehrlichia antibody panel; Future  -     Ambulatory referral to Dermatology; Future  -     Ambulatory referral to Neurology; Future  -     MRI brain wo contrast; Future  -     CK (with reflex to MB); Future  -     Aldolase; Future    Tick bite, initial encounter  -     CBC and differential; Future  -     Comprehensive metabolic panel; Future  -     TSH, 3rd generation with Free T4 reflex; Future  -     Urinalysis with reflex to microscopic  -     EAN Screen w/ Reflex to Titer/Pattern; Future  -     Lyme Antibody Profile with reflex to WB; Future  -     RF Screen w/ Reflex to Titer; Future  -     Sedimentation rate, automated; Future  -     Babesia microti antibody, IgG & Igm; Future  -     Ehrlichia antibody panel; Future  -     Ambulatory referral to Dermatology; Future  -     Ambulatory referral to Neurology; Future  -     MRI brain wo contrast; Future  -     CK (with reflex to MB); Future  -     Aldolase; Future    New daily persistent headache  -     CBC and differential; Future  -     Comprehensive metabolic panel; Future  -     TSH, 3rd generation with Free T4 reflex; Future  -     Urinalysis with reflex to microscopic  -     EAN Screen w/ Reflex to Titer/Pattern; Future  -     Lyme Antibody Profile with reflex to WB; Future  -     RF Screen w/ Reflex to Titer; Future  -     Sedimentation rate, automated; Future  -     Babesia microti antibody, IgG & Igm; Future  -     Ehrlichia antibody panel;  Future  - Ambulatory referral to Dermatology; Future  -     Ambulatory referral to Neurology; Future  -     MRI brain wo contrast; Future  -     CK (with reflex to MB); Future  -     Aldolase; Future    Myalgia  -     CBC and differential; Future  -     Comprehensive metabolic panel; Future  -     TSH, 3rd generation with Free T4 reflex; Future  -     Urinalysis with reflex to microscopic  -     EAN Screen w/ Reflex to Titer/Pattern; Future  -     Lyme Antibody Profile with reflex to WB; Future  -     RF Screen w/ Reflex to Titer; Future  -     Sedimentation rate, automated; Future  -     Babesia microti antibody, IgG & Igm; Future  -     Ehrlichia antibody panel; Future  -     Ambulatory referral to Dermatology; Future  -     Ambulatory referral to Neurology; Future  -     MRI brain wo contrast; Future  -     CK (with reflex to MB); Future  -     Aldolase; Future          Patient Instructions    Patient with a constellation of symptoms and signs  Itchy and even tender rash of the scalp, forehead, and upper trunk and arms  New daily headache   Scalp tenderness to the point of allodynia    Workup to include laboratory testing with tick borne disease markers, brain MRI  Also sedimentation rate and other autoimmune markers  Further recommendations will depend upon results of these initial studies but Dermatology and neurology consult will be placed        Subjective:      Patient ID: Judi Dorsey is a 64 y o  male  Rash  Headache  A diffuse maculopapular rash began about 1 month ago  It covers the upper back, the shoulders, the upper arms and also the far head  The patient has associated with this a sensation of noting soft spots on the top of his head  This rashes is itchy but also feels painful  He reports associated headache starting in the frontal/periorbital region bilaterally and radiating around the side and over the top to the back of the neck  This too began over a month ago    The headache has been present constantly  The patient was treated last year for shingles and still has some residual post herpetic neuralgia in the back  The patient's only changes that he has eliminated a lot of starchy items from his diet  He has no new food introduction is  No new medications  No new topical items such as shaving creams  The following portions of the patient's history were reviewed and updated as appropriate:   He has a past medical history of Hyperlipidemia and Neuropathy  ,  does not have any pertinent problems on file  ,   has a past surgical history that includes No past surgeries  ,  family history includes ALS in his father; Breast cancer in his mother  ,   reports that he quit smoking about 18 years ago  He has a 14 00 pack-year smoking history  He has never used smokeless tobacco  He reports that he drinks about 3 0 - 5 0 standard drinks of alcohol per week  He reports that he does not use drugs  ,  is allergic to nsaids; penicillins; and ibuprofen     Current Outpatient Medications   Medication Sig Dispense Refill    amLODIPine (NORVASC) 5 mg tablet Take 1 tablet (5 mg total) by mouth daily for 90 days 90 tablet 3    multivitamin (THERAGRAN) TABS Take 1 tablet by mouth daily      valsartan-hydrochlorothiazide (DIOVAN-HCT) 320-25 MG per tablet TAKE 1 TABLET BY MOUTH DAILY 90 tablet 2     No current facility-administered medications for this visit  Review of Systems   Constitutional: Negative for fatigue and fever  Respiratory: Negative for chest tightness  Cardiovascular: Negative for chest pain and palpitations  Gastrointestinal: Negative for abdominal pain  Genitourinary: Negative for difficulty urinating  Musculoskeletal: Positive for myalgias and neck stiffness  Negative for arthralgias  Skin: Positive for rash  Hematological: Negative for adenopathy           Objective:  Vitals:    08/13/19 1126   BP: 120/76   Pulse: 82   SpO2: 98%      Physical Exam   Constitutional: He is oriented to person, place, and time  He appears well-developed and well-nourished  Alert male patient who appears to be stated age   HENT:   Head: Normocephalic and atraumatic  Eyes: Pupils are equal, round, and reactive to light  Neck: Normal range of motion  Neck supple  No tracheal deviation present  No thyromegaly present  Cardiovascular: Normal rate, regular rhythm and normal heart sounds  Exam reveals no gallop  No murmur heard  Pulmonary/Chest: Effort normal  No respiratory distress  He has no wheezes  Abdominal: Soft  Musculoskeletal: Normal range of motion  He exhibits tenderness  He exhibits no deformity  Tender to palpation along the top of the head; no visible skin lesions  Neurological: He is alert and oriented to person, place, and time  He has normal reflexes  Coordination normal    Skin: Skin is warm and dry  Rash noted  Diffuse although mild maculopapular rash as reported by the patient  Psychiatric: He has a normal mood and affect

## 2019-08-14 LAB
ANA HOMOGEN SER QL IF: NORMAL
ANA HOMOGEN TITR SER: NORMAL {TITER}
CRYOGLOB RF SER-ACNC: ABNORMAL [IU]/ML
RHEUMATOID FACT SER QL LA: POSITIVE
RYE IGE QN: POSITIVE

## 2019-08-15 LAB
A PHAGOCYTOPH IGG TITR SER IF: NEGATIVE {TITER}
A PHAGOCYTOPH IGM TITR SER IF: NEGATIVE {TITER}
ALDOLASE SERPL-CCNC: 6 U/L (ref 3.3–10.3)
B BURGDOR IGG SER IA-ACNC: 0.97
B BURGDOR IGM SER IA-ACNC: 0.29
B MICROTI IGG TITR SER: NORMAL {TITER}
B MICROTI IGM TITR SER: NORMAL {TITER}
E CHAFFEENSIS IGG TITR SER IF: NEGATIVE {TITER}
E CHAFFEENSIS IGM TITR SER IF: NEGATIVE {TITER}

## 2019-08-23 ENCOUNTER — CONSULT (OUTPATIENT)
Dept: NEUROLOGY | Facility: CLINIC | Age: 59
End: 2019-08-23
Payer: COMMERCIAL

## 2019-08-23 VITALS
DIASTOLIC BLOOD PRESSURE: 82 MMHG | SYSTOLIC BLOOD PRESSURE: 130 MMHG | HEIGHT: 71 IN | HEART RATE: 68 BPM | WEIGHT: 248.8 LBS | BODY MASS INDEX: 34.83 KG/M2

## 2019-08-23 DIAGNOSIS — G44.86 HEADACHE, CERVICOGENIC: Primary | ICD-10-CM

## 2019-08-23 DIAGNOSIS — M25.511 BILATERAL SHOULDER PAIN, UNSPECIFIED CHRONICITY: ICD-10-CM

## 2019-08-23 DIAGNOSIS — M54.2 CERVICALGIA: ICD-10-CM

## 2019-08-23 DIAGNOSIS — M25.512 BILATERAL SHOULDER PAIN, UNSPECIFIED CHRONICITY: ICD-10-CM

## 2019-08-23 PROCEDURE — 99215 OFFICE O/P EST HI 40 MIN: CPT | Performed by: PSYCHIATRY & NEUROLOGY

## 2019-08-23 NOTE — PROGRESS NOTES
Fran Mckeon is a 64 y o  male presents today with complaints of headache    Assessment:  1  Headache, cervicogenic    2  Cervicalgia    3  Bilateral shoulder pain, unspecified chronicity        Plan:  MRI brain (scheduled)  Physical therapy  Orthopedic evaluation  Follow-up 6-8 weeks    Discussion:  Ananya Denis reports a generalized pressure-like headache radiating to his neck that began over a year ago and has been persistent since that time  He does not have vascular symptoms associated with the headache but does report movement of the neck aggravating his head pain  States the symptoms began after a bout of shingles affecting the right arm  He also reports pain in his shoulders radiating toward his elbows associated with movement of his arms and scalp sensitivity  He has an MRI of his brain pending and blood work was remarkable for a mildly positive EAN and positive rheumatoid factor  His sedimentation rate was normal   Suspect his headaches are secondary to cervical spine issues  Have recommended initiating physical therapy  Have also recommended orthopedic evaluation regarding his shoulder pain symptoms and I will see him back in follow-up on these are completed      Subjective:    HPI  Ananya Denis presents today with the above complaints  He states that he had shingles about a year ago and since that time he reports a headache  He states his headache localizes to the frontal area radiating into the lateral head regions and into the neck  He states the pain is a pressure type pain that has been persistent  He states the pain is somewhat worse when he gets up in the morning and also finds that movement of his head aggravates his pain  He denies any vision disturbances, any specific localizing numbness or weakness  He denies any photophobia or sonophobia with the headache  He states that occasionally when he awakens in the morning with the headache he has a little bit and nausea but this quickly passes  With report of depression with suicidal thoughts, with NSSIB last night, superficial cute to lt. forearm.  Hx. of depression, takes fluoxetine.   As per mom and pt. now with suicidal thoughts, denies intent/plan.   Pt. report of stressor with boyfriend, boyfriend with depression and having an episode, pt feels helpless, not able to help.   Mom report of approx 10 lb weight last in last 3 months despite a good appetite, decrease sleep. 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 but otherwise has not had issues with chronic headaches  He reports pain in his neck radiating toward his shoulders as well as pain in his shoulders radiating toward his elbows  He finds that movement of his head side to side amplify his pain as does movement of his shoulders  He reports being allergic to anti-inflammatory medications and states that if he takes Tylenol he sleeps into the next morning  He reports being very sensitive to medications  He had blood work done which was reviewed  His initial Lyme titer was positive however Western blot was negative  Sedimentation rate was 7  CPK was minimally elevated and aldolase was normal   In addition he reports areas of scalp tenderness that seemed to migrate to various parts of the scalp and sometimes feels soft  His EAN was positive with a titer of 1:80 and his rheumatoid factor was positive  He is scheduled for brain MRI next week      Past Medical History:   Diagnosis Date    Hand pain     Headache     Hyperlipidemia     Hypertension     Neuropathy     Shingles 2018       Family History:  Family History   Problem Relation Age of Onset    Breast cancer Mother     ALS Father        Past Surgical History:  Past Surgical History:   Procedure Laterality Date    NO PAST SURGERIES         Social History:   reports that he quit smoking about 18 years ago  He has a 14 00 pack-year smoking history  He has never used smokeless tobacco  He reports that he drinks about 3 0 - 5 0 standard drinks of alcohol per week  He reports that he does not use drugs  Allergies:  Nsaids;  Penicillins; and Ibuprofen      Current Outpatient Medications:     amLODIPine (NORVASC) 5 mg tablet, Take 1 tablet (5 mg total) by mouth daily for 90 days, Disp: 90 tablet, Rfl: 3    multivitamin (THERAGRAN) TABS, Take 1 tablet by mouth daily, Disp: , Rfl:     valsartan-hydrochlorothiazide (DIOVAN-HCT) 320-25 MG per tablet, TAKE 1 TABLET BY MOUTH DAILY, Disp: 90 tablet, Rfl: 2    I have reviewed the past medical, social and family history, current medications, allergies, vitals, review of systems and updated this information as appropriate today     Objective:    Vitals:  Blood pressure 130/82, pulse 68, height 5' 11" (1 803 m), weight 113 kg (248 lb 12 8 oz)  Physical Exam    Neurological Exam    GENERAL:  Cooperative in no acute distress  Well-developed and well-nourished    HEAD and NECK   Head is atraumatic normocephalic with no lesions or masses  He reports diffuse tenderness throughout the scalp to palpation  No enlargement of the temporal arteries was noted  Neck is supple with restricted range of motion with extension and lateral rotation  There is diffuse tenderness with palpation in the lateral cervical regions as well as posterior cervical regions bilaterally  CARDIOVASCULAR  Carotid Arteries-no carotid bruits  MUSCULOSKELETAL:  Pain was noted with abduction and external rotation of the shoulders bilaterally with tenderness over the [facility] joints bilaterally  NEUROLOGIC:  Mental Status-the patient is awake alert and oriented without aphasia or apraxia  Cranial Nerves: Visual fields are full to confrontation  Discs are flat  Extraocular movements are full without nystagmus  Pupils are 2-1/2 mm and reactive  Face is symmetrical to light touch  Movements of facial expression move symmetrically  Hearing is normal to finger rub bilaterally  Soft palate lifts symmetrically  Shoulder shrug is symmetrical  Tongue is midline without atrophy  Motor: No drift is noted on arm extension  Strength is full in the upper and lower extremities with normal bulk and tone  Sensory: Intact to temperature and vibratory sensation in the upper and lower extremities bilaterally  Cortical function is intact  Coordination: Finger to nose testing is performed accurately  Romberg is negative   Gait reveals a normal base with symmetrical arm swing  Tandem walk is normal   Reflexes:  1/4 and symmetrical in the biceps, triceps, brachioradialis, knee jerk and ankle jerk regions  Toes are downgoing            ROS:    Review of Systems   Constitutional: Negative  Negative for appetite change and fever  HENT: Negative  Negative for hearing loss, tinnitus, trouble swallowing and voice change  Eyes: Negative  Negative for photophobia and pain  Respiratory: Positive for shortness of breath and wheezing  Cardiovascular: Negative  Negative for palpitations  Gastrointestinal: Negative  Negative for nausea and vomiting  Endocrine: Negative  Negative for cold intolerance and heat intolerance  Genitourinary: Negative  Negative for dysuria, frequency and urgency  Musculoskeletal: Positive for arthralgias (shoulders), back pain (slight), myalgias (arms), neck pain and neck stiffness  Skin: Positive for rash  Neurological: Positive for tremors, light-headedness, numbness and headaches (frontal, feel like helmet)  Negative for dizziness, seizures, syncope, facial asymmetry, speech difficulty and weakness  Hematological: Negative  Does not bruise/bleed easily  Psychiatric/Behavioral: Negative  Negative for confusion, hallucinations and sleep disturbance  All other systems reviewed and are negative

## 2019-08-26 ENCOUNTER — HOSPITAL ENCOUNTER (OUTPATIENT)
Dept: MRI IMAGING | Facility: HOSPITAL | Age: 59
Discharge: HOME | End: 2019-08-26
Attending: INTERNAL MEDICINE
Payer: COMMERCIAL

## 2019-08-26 DIAGNOSIS — M79.10 MYALGIA: ICD-10-CM

## 2019-08-26 DIAGNOSIS — R21 RASH: ICD-10-CM

## 2019-08-26 DIAGNOSIS — G44.52 NEW DAILY PERSISTENT HEADACHE: ICD-10-CM
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
## 2019-08-26 PROCEDURE — 70551 MRI BRAIN STEM W/O DYE: CPT

## 2019-08-29 ENCOUNTER — OFFICE VISIT (OUTPATIENT)
Dept: INTERNAL MEDICINE CLINIC | Facility: CLINIC | Age: 59
End: 2019-08-29
Payer: COMMERCIAL

## 2019-08-29 ENCOUNTER — TELEPHONE (OUTPATIENT)
Dept: INTERNAL MEDICINE CLINIC | Facility: CLINIC | Age: 59
End: 2019-08-29

## 2019-08-29 ENCOUNTER — APPOINTMENT (OUTPATIENT)
Dept: LAB | Facility: CLINIC | Age: 59
End: 2019-08-29
Payer: COMMERCIAL

## 2019-08-29 VITALS
BODY MASS INDEX: 34.89 KG/M2 | DIASTOLIC BLOOD PRESSURE: 76 MMHG | SYSTOLIC BLOOD PRESSURE: 114 MMHG | WEIGHT: 249.2 LBS | RESPIRATION RATE: 12 BRPM | HEIGHT: 71 IN | HEART RATE: 80 BPM

## 2019-08-29 DIAGNOSIS — M48.02 CERVICAL SPINAL STENOSIS: ICD-10-CM

## 2019-08-29 DIAGNOSIS — Z12.11 SCREENING FOR COLON CANCER: Primary | ICD-10-CM

## 2019-08-29 DIAGNOSIS — M60.9 MYOSITIS OF MULTIPLE SITES, UNSPECIFIED MYOSITIS TYPE: ICD-10-CM

## 2019-08-29 DIAGNOSIS — R21 RASH: ICD-10-CM

## 2019-08-29 DIAGNOSIS — M12.9 ARTHROPATHY: ICD-10-CM
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
## 2019-08-29 LAB
CK MB SERPL-MCNC: 2.4 NG/ML (ref 0–5)
CK MB SERPL-MCNC: <1 % (ref 0–2.5)
CK SERPL-CCNC: 331 U/L (ref 39–308)

## 2019-08-29 PROCEDURE — 3008F BODY MASS INDEX DOCD: CPT | Performed by: INTERNAL MEDICINE

## 2019-08-29 PROCEDURE — 82553 CREATINE MB FRACTION: CPT

## 2019-08-29 PROCEDURE — 36415 COLL VENOUS BLD VENIPUNCTURE: CPT

## 2019-08-29 PROCEDURE — 86666 EHRLICHIA ANTIBODY: CPT

## 2019-08-29 PROCEDURE — 86618 LYME DISEASE ANTIBODY: CPT

## 2019-08-29 PROCEDURE — 99213 OFFICE O/P EST LOW 20 MIN: CPT | Performed by: INTERNAL MEDICINE

## 2019-08-29 PROCEDURE — 86617 LYME DISEASE ANTIBODY: CPT

## 2019-08-29 PROCEDURE — 1036F TOBACCO NON-USER: CPT | Performed by: INTERNAL MEDICINE

## 2019-08-29 PROCEDURE — 82550 ASSAY OF CK (CPK): CPT

## 2019-08-29 PROCEDURE — 86753 PROTOZOA ANTIBODY NOS: CPT

## 2019-08-29 NOTE — TELEPHONE ENCOUNTER
Patient called stating that he was here this morning to see Dr Van Gonzalez for an appt  He stated that Dr Paulo White mentioned that he should get an MRI of his neck done  He did not receive any order for that and I checked his chart and did no see an order either   Please contact patient and let him know if this can be ordered at  [phone]

## 2019-08-29 NOTE — PROGRESS NOTES
Assessment/Plan:       Diagnoses and all orders for this visit:    Screening for colon cancer  -     Cologuard; Future    Arthropathy  -     Lyme Antibody Profile with reflex to WB; Future  -     Ehrlichia antibody panel; Future  -     Babesia microti antibody, IgG & Igm; Future  -     Ambulatory referral to Rheumatology; Future    Rash  -     Lyme Antibody Profile with reflex to WB; Future  -     Ehrlichia antibody panel; Future  -     Babesia microti antibody, IgG & Igm; Future  -     Ambulatory referral to Rheumatology; Future    Cervical spinal stenosis    Myositis of multiple sites, unspecified myositis type  -     CK (with reflex to MB); Future  -     Ambulatory referral to Rheumatology; Future          Patient Instructions   A patient with the above constellation of symptoms and signs and some abnormal findings  Cavernous hemangioma in the caudate nucleus of the brain:  I believe this is an incidental finding and deserves a repeat MRI but I think it has nothing to do with the symptoms  Elevated CK, positive EAN, positive rheumatoid factor: The titers are low but 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  I also think that at this point treatments are worse than the illness  I would recommend a rheumatology consultation  Tick borne disease titers were negative  I would ask him to wait another month and then recheck those just to be complete  Self referral to physical therapy for the neck pain  Recommend PT for the neck pain at any location 2 days weekly x6 weeks  Get the MRI of the neck to look for cervical spinal stenosis  Subjective:      Patient ID: Skye Olvera is a 64 y o  male       The patient was seen a couple of weeks ago with a constellation of symptoms and signs:  Itchy and even tender rash of the scalp, forehead, and upper trunk and arms  New daily headache   Scalp tenderness to the point of allodynia     Workup included laboratory testing with tick borne disease markers, brain MRI  Also sedimentation rate and other autoimmune markers     CPK elevated 356   EAN positive   Speckled pattern 1-80  Rheumatoid factor positive   1-20  Sedimentation rate only 7  Erhlichia panel negative   Lyme negative          The following portions of the patient's history were reviewed and updated as appropriate:   He has a past medical history of Hand pain, Headache, Hyperlipidemia, and Neuropathy  ,  does not have any pertinent problems on file  ,   has a past surgical history that includes No past surgeries  ,  family history includes ALS in his father; Breast cancer in his mother  ,   reports that he quit smoking about 18 years ago  He has a 14 00 pack-year smoking history  He has never used smokeless tobacco  He reports that he drinks about 3 0 - 5 0 standard drinks of alcohol per week  He reports that he does not use drugs  ,  is allergic to nsaids; penicillins; and ibuprofen     Current Outpatient Medications   Medication Sig Dispense Refill    amLODIPine (NORVASC) 5 mg tablet Take 1 tablet (5 mg total) by mouth daily for 90 days 90 tablet 3    multivitamin (THERAGRAN) TABS Take 1 tablet by mouth daily      valsartan-hydrochlorothiazide (DIOVAN-HCT) 320-25 MG per tablet TAKE 1 TABLET BY MOUTH DAILY 90 tablet 2     No current facility-administered medications for this visit  Review of Systems   Constitutional: Negative for fatigue and fever  Respiratory: Negative for chest tightness  Cardiovascular: Negative for chest pain and palpitations  Gastrointestinal: Negative for abdominal pain  Genitourinary: Negative for difficulty urinating  Musculoskeletal: Positive for myalgias and neck stiffness  Negative for arthralgias  Skin: Positive for rash  Hematological: Negative for adenopathy  Objective:  Vitals:    08/29/19 0753   BP: 114/76   Pulse: 80   Resp: 12      Physical Exam   Constitutional: He is oriented to person, place, and time   He appears well-developed and well-nourished  Alert male patient who appears to be stated age   HENT:   Head: Normocephalic and atraumatic  Eyes: Pupils are equal, round, and reactive to light  Neck: Normal range of motion  Neck supple  No tracheal deviation present  No thyromegaly present  Cardiovascular: Normal rate, regular rhythm and normal heart sounds  Exam reveals no gallop  No murmur heard  Pulmonary/Chest: Effort normal  No respiratory distress  He has no wheezes  Abdominal: Soft  Musculoskeletal: Normal range of motion  He exhibits tenderness  He exhibits no deformity  Tender to palpation along the top of the head; no visible skin lesions  Neurological: He is alert and oriented to person, place, and time  He has normal reflexes  Coordination normal    Skin: Skin is warm and dry  Rash noted  Diffuse although mild maculopapular rash as reported by the patient  Psychiatric: He has a normal mood and affect

## 2019-08-29 NOTE — PATIENT INSTRUCTIONS
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

## 2019-08-30 LAB
B BURGDOR IGG PATRN SER IB-IMP: NEGATIVE
B BURGDOR IGG+IGM SER-ACNC: 1.35 ISR (ref 0–0.9)
B BURGDOR IGM PATRN SER IB-IMP: NEGATIVE
B BURGDOR18KD IGG SER QL IB: PRESENT
B BURGDOR23KD IGG SER QL IB: PRESENT
B BURGDOR23KD IGM SER QL IB: ABNORMAL
B BURGDOR28KD IGG SER QL IB: ABNORMAL
B BURGDOR30KD IGG SER QL IB: ABNORMAL
B BURGDOR39KD IGG SER QL IB: PRESENT
B BURGDOR39KD IGM SER QL IB: ABNORMAL
B BURGDOR41KD IGG SER QL IB: PRESENT
B BURGDOR41KD IGM SER QL IB: ABNORMAL
B BURGDOR45KD IGG SER QL IB: ABNORMAL
B BURGDOR58KD IGG SER QL IB: ABNORMAL
B BURGDOR66KD IGG SER QL IB: ABNORMAL
B BURGDOR93KD IGG SER QL IB: ABNORMAL

## 2019-09-03 LAB
B MICROTI IGG TITR SER: NORMAL {TITER}
B MICROTI IGM TITR SER: NORMAL {TITER}

## 2019-09-05 LAB
A PHAGOCYTOPH IGG TITR SER IF: NEGATIVE {TITER}
A PHAGOCYTOPH IGM TITR SER IF: NEGATIVE {TITER}
E CHAFFEENSIS IGG TITR SER IF: NEGATIVE {TITER}
E CHAFFEENSIS IGM TITR SER IF: NEGATIVE {TITER}

## 2019-09-11 ENCOUNTER — HOSPITAL ENCOUNTER (OUTPATIENT)
Dept: MRI IMAGING | Facility: HOSPITAL | Age: 59
Discharge: HOME | End: 2019-09-11
Attending: INTERNAL MEDICINE
Payer: COMMERCIAL

## 2019-09-11 DIAGNOSIS — G62.9 NEUROPATHY: ICD-10-CM

## 2019-09-11 DIAGNOSIS — M48.02 CERVICAL SPINAL STENOSIS: ICD-10-CM

## 2019-09-11 DIAGNOSIS — M12.9 ARTHROPATHY: ICD-10-CM

## 2019-09-11 PROCEDURE — 72141 MRI NECK SPINE W/O DYE: CPT

## 2019-09-19 ENCOUNTER — OFFICE VISIT (OUTPATIENT)
Dept: INTERNAL MEDICINE CLINIC | Facility: CLINIC | Age: 59
End: 2019-09-19
Payer: COMMERCIAL

## 2019-09-19 VITALS
BODY MASS INDEX: 34.3 KG/M2 | HEIGHT: 71 IN | WEIGHT: 245 LBS | OXYGEN SATURATION: 98 % | HEART RATE: 85 BPM | SYSTOLIC BLOOD PRESSURE: 100 MMHG | DIASTOLIC BLOOD PRESSURE: 72 MMHG

## 2019-09-19 DIAGNOSIS — M48.02 CERVICAL SPINAL STENOSIS: Primary | ICD-10-CM

## 2019-09-19 DIAGNOSIS — G89.29 CHRONIC BILATERAL LOW BACK PAIN WITH LEFT-SIDED SCIATICA: ICD-10-CM

## 2019-09-19 DIAGNOSIS — M54.42 CHRONIC BILATERAL LOW BACK PAIN WITH LEFT-SIDED SCIATICA: ICD-10-CM

## 2019-09-19 PROCEDURE — 3008F BODY MASS INDEX DOCD: CPT | Performed by: INTERNAL MEDICINE

## 2019-09-19 PROCEDURE — 99213 OFFICE O/P EST LOW 20 MIN: CPT | Performed by: INTERNAL MEDICINE

## 2019-09-19 NOTE — PATIENT INSTRUCTIONS
Neck pain among other symptoms  The cervical MRI documents the presence of cervical spondylolisthesis and also cervical spinal stenosis and foraminal stenosis, most pronounced at C4-C5  This will generate a referral to neuro surgery  The patient has had lumbar pain for 5 years  I will have him start with a lumbar x-ray in the interval   Any abnormality there would precipitate another MRI of the lumbar spine

## 2019-09-19 NOTE — PROGRESS NOTES
Assessment/Plan:       Diagnoses and all orders for this visit:    Cervical spinal stenosis  -     Ambulatory referral to Neurosurgery; Future    Chronic bilateral low back pain with left-sided sciatica  -     XR spine lumbar minimum 4 views non injury; Future          Patient Instructions   Neck pain among other symptoms  The cervical MRI documents the presence of cervical spondylolisthesis and also cervical spinal stenosis and foraminal stenosis, most pronounced at C4-C5  This will generate a referral to neuro surgery  The patient has had lumbar pain for 5 years  I will have him start with a lumbar x-ray in the interval   Any abnormality there would precipitate another MRI of the lumbar spine  Subjective:      Patient ID: Favio Mora is a 64 y o  male  The patient had been seen for constellation of symptoms with really no signs  Please refer to prior no  Imaging of the C-spine as documented cervical spondylolisthesis and spinal stenosis  He will be referred to Neurosurgery  Complaint of lumbar pain with left sciatica  Will start with plain film but I suspect we will be also dealing with lumbar stenosis  The following portions of the patient's history were reviewed and updated as appropriate:   He has a past medical history of Hand pain, Headache, Hyperlipidemia, and Neuropathy  ,  does not have any pertinent problems on file  ,   has a past surgical history that includes No past surgeries  ,  family history includes ALS in his father; Breast cancer in his mother  ,   reports that he quit smoking about 18 years ago  He has a 14 00 pack-year smoking history  He has never used smokeless tobacco  He reports that he drinks about 3 0 - 5 0 standard drinks of alcohol per week  He reports that he does not use drugs  ,  is allergic to nsaids; penicillins; and ibuprofen     Current Outpatient Medications   Medication Sig Dispense Refill    amLODIPine (NORVASC) 5 mg tablet Take 1 tablet (5 mg total) by mouth daily for 90 days 90 tablet 3    multivitamin (THERAGRAN) TABS Take 1 tablet by mouth daily      valsartan-hydrochlorothiazide (DIOVAN-HCT) 320-25 MG per tablet TAKE 1 TABLET BY MOUTH DAILY 90 tablet 2     No current facility-administered medications for this visit  Review of Systems   Constitutional: Negative for fatigue and fever  Respiratory: Negative for chest tightness  Cardiovascular: Negative for chest pain and palpitations  Gastrointestinal: Negative for abdominal pain  Genitourinary: Negative for difficulty urinating  Musculoskeletal: Positive for back pain, myalgias and neck stiffness  Negative for arthralgias  Skin: Positive for rash  Hematological: Negative for adenopathy  Objective:  Vitals:    09/19/19 1326   BP: 100/72   Pulse: 85   SpO2: 98%      Physical Exam   Constitutional: He is oriented to person, place, and time  He appears well-developed and well-nourished  Alert male patient who appears to be stated age   HENT:   Head: Normocephalic and atraumatic  Eyes: Pupils are equal, round, and reactive to light  Neck: Normal range of motion  Neck supple  No tracheal deviation present  No thyromegaly present  Cardiovascular: Normal rate, regular rhythm and normal heart sounds  Exam reveals no gallop  No murmur heard  Pulmonary/Chest: Effort normal  No respiratory distress  He has no wheezes  Abdominal: Soft  Musculoskeletal: Normal range of motion  He exhibits tenderness  He exhibits no deformity  Tender to palpation along the top of the head; no visible skin lesions  Neurological: He is alert and oriented to person, place, and time  He has normal reflexes  Coordination normal    Skin: Skin is warm and dry  Rash noted  Diffuse although mild maculopapular rash as reported by the patient  Psychiatric: He has a normal mood and affect

## 2019-09-23 ENCOUNTER — APPOINTMENT (OUTPATIENT)
Dept: RADIOLOGY | Facility: CLINIC | Age: 59
End: 2019-09-23
Payer: COMMERCIAL

## 2019-09-23 DIAGNOSIS — G89.29 CHRONIC BILATERAL LOW BACK PAIN WITH LEFT-SIDED SCIATICA: ICD-10-CM

## 2019-09-23 DIAGNOSIS — M54.42 CHRONIC BILATERAL LOW BACK PAIN WITH LEFT-SIDED SCIATICA: ICD-10-CM

## 2019-09-23 PROCEDURE — 72110 X-RAY EXAM L-2 SPINE 4/>VWS: CPT

## 2019-09-25 ENCOUNTER — TELEPHONE (OUTPATIENT)
Dept: NEUROLOGY | Facility: CLINIC | Age: 59
End: 2019-09-25

## 2019-09-25 NOTE — TELEPHONE ENCOUNTER
Called and spoke with patient, about an opening that Dr Fredirick Bamberger has tomorrow on 9/26 at 3:00, he said he will call back to confirm

## 2019-10-09 ENCOUNTER — CONSULT (OUTPATIENT)
Dept: NEUROSURGERY | Facility: CLINIC | Age: 59
End: 2019-10-09
Payer: COMMERCIAL

## 2019-10-09 VITALS
WEIGHT: 242 LBS | DIASTOLIC BLOOD PRESSURE: 66 MMHG | HEART RATE: 61 BPM | RESPIRATION RATE: 16 BRPM | HEIGHT: 71 IN | SYSTOLIC BLOOD PRESSURE: 126 MMHG | BODY MASS INDEX: 33.88 KG/M2 | TEMPERATURE: 97.4 F

## 2019-10-09 DIAGNOSIS — B02.23 SHINGLES (HERPES ZOSTER) POLYNEUROPATHY: Primary | ICD-10-CM

## 2019-10-09 DIAGNOSIS — M48.02 CERVICAL SPINAL STENOSIS: ICD-10-CM

## 2019-10-09 PROCEDURE — 99243 OFF/OP CNSLTJ NEW/EST LOW 30: CPT | Performed by: NEUROLOGICAL SURGERY

## 2019-10-09 RX ORDER — AMLODIPINE BESYLATE AND ATORVASTATIN CALCIUM 5; 10 MG/1; MG/1
1 TABLET, FILM COATED ORAL DAILY
COMMUNITY
End: 2019-11-08

## 2019-10-09 RX ORDER — B-COMPLEX WITH VITAMIN C
TABLET ORAL DAILY
COMMUNITY

## 2019-10-09 RX ORDER — MULTIVIT WITH MINERALS/LUTEIN
750 TABLET ORAL DAILY
COMMUNITY

## 2019-10-09 NOTE — PROGRESS NOTES
Assessment/Plan:    No problem-specific Assessment & Plan notes found for this encounter  Problem List Items Addressed This Visit        Other    Cervical spinal stenosis      Other Visit Diagnoses     Shingles (herpes zoster) polyneuropathy    -  Primary            Subjective:      Patient ID: Ayleen Landers is a 64 y o  male  HPI    The following portions of the patient's history were reviewed and updated as appropriate:   He  has a past medical history of Hand pain, Headache, Hyperlipidemia, and Neuropathy  He   Patient Active Problem List    Diagnosis Date Noted    Chronic bilateral low back pain with left-sided sciatica 09/19/2019    Rash 08/13/2019    New daily persistent headache 08/13/2019    Myalgia 08/13/2019    Arthropathy 12/11/2018    Cervical spinal stenosis 12/11/2018    Herpes zoster without complication 05/88/5357    Neuropathy 02/17/2018    Essential hypertension 12/17/2016    Combined hyperlipidemia 10/18/2016    Elevation of level of transaminase or lactic acid dehydrogenase (LDH) 10/10/2016    Hypertensive heart disease without heart failure 10/08/2016     He  has a past surgical history that includes No past surgeries  His family history includes ALS in his father; Breast cancer in his mother  He  reports that he quit smoking about 18 years ago  He has a 14 00 pack-year smoking history  He has never used smokeless tobacco  He reports that he drinks about 3 0 - 5 0 standard drinks of alcohol per week  He reports that he does not use drugs    Current Outpatient Medications   Medication Sig Dispense Refill    amLODIPine-atorvastatin (CADUET) 5-10 MG per tablet Take 1 tablet by mouth daily      Ascorbic Acid (VITAMIN C) 100 MG tablet Take 100 mg by mouth daily      cyanocobalamin (VITAMIN B-12) 100 MCG tablet Take 250 mcg by mouth daily      multivitamin (THERAGRAN) TABS Take 1 tablet by mouth daily      valsartan-hydrochlorothiazide (DIOVAN-HCT) 320-25 MG per tablet TAKE 1 TABLET BY MOUTH DAILY 90 tablet 2    Zinc 100 MG TABS Take by mouth      amLODIPine (NORVASC) 5 mg tablet Take 1 tablet (5 mg total) by mouth daily for 90 days 90 tablet 3     No current facility-administered medications for this visit  Current Outpatient Medications on File Prior to Visit   Medication Sig    amLODIPine-atorvastatin (CADUET) 5-10 MG per tablet Take 1 tablet by mouth daily    Ascorbic Acid (VITAMIN C) 100 MG tablet Take 100 mg by mouth daily    cyanocobalamin (VITAMIN B-12) 100 MCG tablet Take 250 mcg by mouth daily    multivitamin (THERAGRAN) TABS Take 1 tablet by mouth daily    valsartan-hydrochlorothiazide (DIOVAN-HCT) 320-25 MG per tablet TAKE 1 TABLET BY MOUTH DAILY    Zinc 100 MG TABS Take by mouth    amLODIPine (NORVASC) 5 mg tablet Take 1 tablet (5 mg total) by mouth daily for 90 days     No current facility-administered medications on file prior to visit  He is allergic to nsaids; penicillins; and ibuprofen       Review of Systems   Constitutional: Positive for chills, fatigue and fever  HENT: Negative  Eyes: Negative  Respiratory: Positive for shortness of breath  Cardiovascular: Negative  Gastrointestinal: Negative  Endocrine: Negative  Genitourinary: Negative  Musculoskeletal: Positive for back pain (L>R bilat legs), gait problem, neck pain (bilat shoulder and arm) and neck stiffness (decreased ROM on neck)  Neurological: Positive for weakness (UE and LE) and numbness (UE and LE)  Hematological: Negative  Psychiatric/Behavioral: Positive for sleep disturbance  Objective:      /66 (BP Location: Left arm, Patient Position: Sitting, Cuff Size: Standard)   Pulse 61   Temp (!) 97 4 °F (36 3 °C) (Tympanic)   Resp 16   Ht 5' 11" (1 803 m)   Wt 110 kg (242 lb)   BMI 33 75 kg/m²           I have personally obtain history and examined patient  I have personally reviewed case including all pertinent investigations/studies  Time spent 40 minutes  More than 50% of total time spent on counseling and coordination of care as described above including patient education, discussion of risks and rationale of conservative vs surgical treatment options  HPI    Severe upper trunk/UE herpes zoster last yr, with neck discomfort/stiffness and vague bilateral arm paraesthesia/discomfort  Denies weakness, loss of fine motor control, bowel or bladder issues  Exam    Moderate paravertebral spasm  Moderate limit in cervical ROM  Negative spurling's  Full strength bilateral UE and LE  Normal DTR  Grossly intact sensation  Normal fine motor and coordination  Normal gait and tandem                    Neck:   Supple, symmetrical, trachea midline, no adenopathy;        thyroid:  No enlargement/tenderness/nodules; no carotid    bruit or JVD                               Extremities:   Extremities normal, atraumatic, no cyanosis or edema   Pulses:   2+ and symmetric all extremities   Skin:   Skin color, texture, turgor normal, no rashes or lesions     Radiology    MRI    Congenital cervical spinal stenosis in the setting of mild to moderate  Spondylotic degeneration  No filomena cord or nerve involvement    Summary and Plan    Mr Kinza Staples has generalized neck, UE discomfort as well as back pain in the setting of what sounds like a severe case of herpes zoster polyneuropathy  Today we reviewed the conservative options including PT, OZ and medications  I explained to him that I see no indications for surgery  I have referred him to Dr Sasha Tejada and will see him on a PRN basis

## 2019-10-11 ENCOUNTER — TELEPHONE (OUTPATIENT)
Dept: INTERNAL MEDICINE CLINIC | Facility: CLINIC | Age: 59
End: 2019-10-11

## 2019-10-11 NOTE — TELEPHONE ENCOUNTER
Patient informed me that he is going to do the cologuard kit that was sent to him  He was away but is now back and has the intentions on doing it

## 2019-10-18 ENCOUNTER — OFFICE VISIT (OUTPATIENT)
Dept: NEUROLOGY | Facility: CLINIC | Age: 59
End: 2019-10-18
Payer: COMMERCIAL

## 2019-10-18 VITALS
DIASTOLIC BLOOD PRESSURE: 82 MMHG | HEART RATE: 66 BPM | HEIGHT: 71 IN | BODY MASS INDEX: 33.74 KG/M2 | SYSTOLIC BLOOD PRESSURE: 130 MMHG | WEIGHT: 241 LBS

## 2019-10-18 DIAGNOSIS — M54.2 CERVICALGIA: ICD-10-CM

## 2019-10-18 DIAGNOSIS — G44.86 HEADACHE, CERVICOGENIC: Primary | ICD-10-CM

## 2019-10-18 PROCEDURE — 99213 OFFICE O/P EST LOW 20 MIN: CPT | Performed by: PSYCHIATRY & NEUROLOGY

## 2019-10-18 RX ORDER — AMLODIPINE BESYLATE 5 MG/1
5 TABLET ORAL DAILY
COMMUNITY
End: 2020-01-27

## 2019-10-18 NOTE — PROGRESS NOTES
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 which was an incidental finding  He later had an MRI of the cervical spine which demonstrated multilevel spondylitic changes most severe at C4-5 with neural foraminal narrowing and mild central canal stenosis without abnormal cord signal   He has yet to initiate physical therapy  He will initiate physical therapy in follow-up in 2 months  If his symptoms persist despite therapy consider pain management referral      Subjective:    HPI  Sachin Wiseman continues to report pain in his neck radiating up in his head  He states that he tried taking gabapentin and did not like the way mid feel  He states he was shaky and felt out of it  He is allergic to NSAIDs  He states that Tylenol make some very lethargic and he sleeps for several hours  He does find that warm compresses or helpful  He is yet to begin physical therapy  His MRI of the brain demonstrated angioma in the region of the left caudate nucleus-an incidental finding  MRI of the cervical spine demonstrated spondylitic changes most severe at C4-5 with neuroforaminal and mild canal stenosis  He was referred to Dr Ryan Bui who did not feel he was a surgical candidate and recommended conservative approach        Past Medical History:   Diagnosis Date    Hand pain     Headache     Hyperlipidemia     Neuropathy        Family History:  Family History   Problem Relation Age of Onset    Breast cancer Mother     ALS Father        Past Surgical History:  Past Surgical History:   Procedure Laterality Date    NO PAST SURGERIES         Social History:   reports that he quit smoking about 25 years ago  He has a 14 00 pack-year smoking history  He has never used smokeless tobacco  He reports that he drinks about 3 0 - 5 0 standard drinks of alcohol per week  He reports that he does not use drugs  Allergies:  Nsaids; Penicillins; and Ibuprofen      Current Outpatient Medications:     amLODIPine (NORVASC) 5 mg tablet, Take 5 mg by mouth daily, Disp: , Rfl:     amLODIPine-atorvastatin (CADUET) 5-10 MG per tablet, Take 1 tablet by mouth daily, Disp: , Rfl:     ascorbic acid (VITAMIN C) 250 mg tablet, Take 500 mg by mouth daily , Disp: , Rfl:     cyanocobalamin (VITAMIN B-12) 100 MCG tablet, Take 250 mcg by mouth daily, Disp: , Rfl:     multivitamin (THERAGRAN) TABS, Take 1 tablet by mouth daily, Disp: , Rfl:     valsartan-hydrochlorothiazide (DIOVAN-HCT) 320-25 MG per tablet, TAKE 1 TABLET BY MOUTH DAILY, Disp: 90 tablet, Rfl: 2    Zinc 100 MG TABS, Take by mouth daily , Disp: , Rfl:     amLODIPine (NORVASC) 5 mg tablet, Take 1 tablet (5 mg total) by mouth daily for 90 days, Disp: 90 tablet, Rfl: 3    I have reviewed the past medical, social and family history, current medications, allergies, vitals, review of systems and updated this information as appropriate today     Objective:    Vitals:  Blood pressure 130/82, pulse 66, height 5' 11" (1 803 m), weight 109 kg (241 lb)  Physical Exam    Neurological Exam  GENERAL:  Well-developed well-nourished man in no acute distress  HEENT/NECK: Head is atraumatic normocephalic, neck is supple with restricted range of motion  NEUROLOGIC:  Mental Status: Awake and alert without aphasia  Cranial Nerves: Extraocular movements are full  Face is symmetrical  Coordination:  Gait is stable            ROS:    Review of Systems   Constitutional: Positive for chills, diaphoresis and fatigue  Negative for appetite change and fever  HENT: Positive for tinnitus  Negative for hearing loss, trouble swallowing and voice change  Eyes: Positive for pain   Negative for photophobia  Respiratory: Positive for shortness of breath  Cardiovascular: Negative  Negative for chest pain and palpitations  Gastrointestinal: Positive for abdominal pain and nausea  Negative for constipation, diarrhea and vomiting  Endocrine: Negative  Negative for cold intolerance and heat intolerance  Genitourinary: Negative  Negative for dysuria, frequency and urgency  Musculoskeletal: Positive for arthralgias, back pain, gait problem and neck pain  Negative for myalgias  Skin: Positive for rash  Neurological: Positive for dizziness, tremors, weakness, numbness (hands) and headaches  Negative for seizures, syncope, facial asymmetry, speech difficulty and light-headedness  Electrical shocking pain of chest, back, upper arms   Hematological: Negative  Does not bruise/bleed easily  Psychiatric/Behavioral: Positive for sleep disturbance (difficulty staying asleep)  Negative for confusion, decreased concentration and hallucinations

## 2019-10-28 DIAGNOSIS — I10 ESSENTIAL HYPERTENSION: ICD-10-CM

## 2019-10-28 RX ORDER — VALSARTAN AND HYDROCHLOROTHIAZIDE 320; 25 MG/1; MG/1
1 TABLET, FILM COATED ORAL DAILY
Qty: 90 TABLET | Refills: 2 | Status: SHIPPED | OUTPATIENT
Start: 2019-10-28 | End: 2020-10-22 | Stop reason: SDUPTHER

## 2019-11-08 ENCOUNTER — CONSULT (OUTPATIENT)
Dept: PAIN MEDICINE | Facility: CLINIC | Age: 59
End: 2019-11-08
Payer: COMMERCIAL

## 2019-11-08 VITALS
WEIGHT: 240.8 LBS | DIASTOLIC BLOOD PRESSURE: 74 MMHG | BODY MASS INDEX: 33.71 KG/M2 | HEIGHT: 71 IN | HEART RATE: 80 BPM | SYSTOLIC BLOOD PRESSURE: 126 MMHG | RESPIRATION RATE: 18 BRPM

## 2019-11-08 DIAGNOSIS — M54.12 CERVICAL RADICULOPATHY: Primary | ICD-10-CM

## 2019-11-08 DIAGNOSIS — M48.02 CERVICAL SPINAL STENOSIS: ICD-10-CM

## 2019-11-08 DIAGNOSIS — B02.23 SHINGLES (HERPES ZOSTER) POLYNEUROPATHY: ICD-10-CM

## 2019-11-08 DIAGNOSIS — B02.29 POST HERPETIC NEURALGIA: ICD-10-CM

## 2019-11-08 PROCEDURE — 99244 OFF/OP CNSLTJ NEW/EST MOD 40: CPT | Performed by: ANESTHESIOLOGY

## 2019-11-08 RX ORDER — PREGABALIN 75 MG/1
75 CAPSULE ORAL 2 TIMES DAILY
Qty: 60 CAPSULE | Refills: 1 | Status: SHIPPED | OUTPATIENT
Start: 2019-11-08 | End: 2019-12-08
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
## 2019-12-13 ENCOUNTER — TELEPHONE (OUTPATIENT)
Dept: INTERNAL MEDICINE CLINIC | Facility: CLINIC | Age: 59
End: 2019-12-13

## 2019-12-13 NOTE — TELEPHONE ENCOUNTER
Pt returned call; he has the cologuard box in the house  He's moving and hasn't had time to do the test yet

## 2019-12-18 ENCOUNTER — TELEPHONE (OUTPATIENT)
Dept: NEUROLOGY | Facility: CLINIC | Age: 59
End: 2019-12-18

## 2020-01-26 DIAGNOSIS — I10 ESSENTIAL HYPERTENSION: Primary | ICD-10-CM

## 2020-01-27 RX ORDER — AMLODIPINE BESYLATE 5 MG/1
TABLET ORAL
Qty: 90 TABLET | Refills: 0 | Status: SHIPPED | OUTPATIENT
Start: 2020-01-27 | End: 2020-06-18

## 2020-06-17 DIAGNOSIS — I10 ESSENTIAL HYPERTENSION: ICD-10-CM

## 2020-06-18 RX ORDER — AMLODIPINE BESYLATE 5 MG/1
TABLET ORAL
Qty: 90 TABLET | Refills: 0 | Status: SHIPPED | OUTPATIENT
Start: 2020-06-18 | End: 2020-10-12

## 2020-10-12 DIAGNOSIS — I10 ESSENTIAL HYPERTENSION: ICD-10-CM

## 2020-10-12 RX ORDER — AMLODIPINE BESYLATE 5 MG/1
TABLET ORAL
Qty: 90 TABLET | Refills: 0 | Status: SHIPPED | OUTPATIENT
Start: 2020-10-12 | End: 2021-01-13

## 2020-10-21 DIAGNOSIS — I10 ESSENTIAL HYPERTENSION: ICD-10-CM

## 2020-10-22 ENCOUNTER — TELEPHONE (OUTPATIENT)
Dept: INTERNAL MEDICINE CLINIC | Facility: CLINIC | Age: 60
End: 2020-10-22

## 2020-10-22 DIAGNOSIS — I10 ESSENTIAL HYPERTENSION: ICD-10-CM

## 2020-10-26 RX ORDER — VALSARTAN AND HYDROCHLOROTHIAZIDE 320; 25 MG/1; MG/1
1 TABLET, FILM COATED ORAL DAILY
Qty: 90 TABLET | Refills: 2 | Status: SHIPPED | OUTPATIENT
Start: 2020-10-26

## 2020-10-26 RX ORDER — VALSARTAN AND HYDROCHLOROTHIAZIDE 320; 25 MG/1; MG/1
1 TABLET, FILM COATED ORAL DAILY
Qty: 90 TABLET | Refills: 2 | OUTPATIENT
Start: 2020-10-26

## 2021-01-12 DIAGNOSIS — I10 ESSENTIAL HYPERTENSION: ICD-10-CM

## 2021-01-13 RX ORDER — AMLODIPINE BESYLATE 5 MG/1
TABLET ORAL
Qty: 90 TABLET | Refills: 0 | Status: SHIPPED | OUTPATIENT
Start: 2021-01-13 | End: 2021-05-12

## 2021-02-10 DIAGNOSIS — I10 ESSENTIAL HYPERTENSION: ICD-10-CM

## 2021-02-10 NOTE — TELEPHONE ENCOUNTER
amLODIPine (NORVASC) 5 mg tablet    valsartan-hydrochlorothiazide (DIOVAN-HCT) 320-25 MG per tablet      [facility] in [place] # [phone]    [facility] # [phone]

## 2021-02-11 RX ORDER — AMLODIPINE BESYLATE 5 MG/1
5 TABLET ORAL DAILY
Qty: 90 TABLET | Refills: 0 | OUTPATIENT
Start: 2021-02-11

## 2021-02-11 RX ORDER — VALSARTAN AND HYDROCHLOROTHIAZIDE 320; 25 MG/1; MG/1
1 TABLET, FILM COATED ORAL DAILY
Qty: 90 TABLET | Refills: 2 | OUTPATIENT
Start: 2021-02-11

## 2021-02-11 NOTE — TELEPHONE ENCOUNTER
Spoke w/ wife, they moved to [place] and need refill before they find a new dr    [facility] # [phone]

## 2021-05-09 DIAGNOSIS — I10 ESSENTIAL HYPERTENSION: ICD-10-CM

## 2021-05-12 RX ORDER — AMLODIPINE BESYLATE 5 MG/1
TABLET ORAL
Qty: 90 TABLET | Refills: 0 | Status: SHIPPED | OUTPATIENT
Start: 2021-05-12

## 2022-08-25 ENCOUNTER — HOSPITAL ENCOUNTER (EMERGENCY)
Facility: HOSPITAL | Age: 62
Discharge: HOME | End: 2022-08-25
Attending: EMERGENCY MEDICINE

## 2022-08-25 ENCOUNTER — APPOINTMENT (EMERGENCY)
Dept: CT IMAGING | Facility: HOSPITAL | Age: 62
End: 2022-08-25

## 2022-08-25 VITALS
DIASTOLIC BLOOD PRESSURE: 69 MMHG | TEMPERATURE: 98.2 F | RESPIRATION RATE: 16 BRPM | HEART RATE: 55 BPM | SYSTOLIC BLOOD PRESSURE: 134 MMHG | HEIGHT: 71 IN | BODY MASS INDEX: 35.7 KG/M2 | OXYGEN SATURATION: 97 % | WEIGHT: 255 LBS

## 2022-08-25 DIAGNOSIS — R10.11 RIGHT UPPER QUADRANT ABDOMINAL PAIN: Primary | ICD-10-CM

## 2022-08-25 LAB
2HR DELTA HS TROPONIN: 1 NG/L
ALBUMIN SERPL BCP-MCNC: 3.8 G/DL (ref 3.5–5)
ALP SERPL-CCNC: 92 U/L (ref 46–116)
ALT SERPL W P-5'-P-CCNC: 30 U/L (ref 12–78)
ANION GAP SERPL CALCULATED.3IONS-SCNC: 8 MMOL/L (ref 4–13)
AST SERPL W P-5'-P-CCNC: 19 U/L (ref 5–45)
BASOPHILS # BLD AUTO: 0.05 THOUSANDS/ΜL (ref 0–0.1)
BASOPHILS NFR BLD AUTO: 1 % (ref 0–1)
BILIRUB SERPL-MCNC: 0.25 MG/DL (ref 0.2–1)
BUN SERPL-MCNC: 9 MG/DL (ref 5–25)
CALCIUM SERPL-MCNC: 8.5 MG/DL (ref 8.3–10.1)
CARDIAC TROPONIN I PNL SERPL HS: 5 NG/L
CARDIAC TROPONIN I PNL SERPL HS: 6 NG/L
CHLORIDE SERPL-SCNC: 107 MMOL/L (ref 96–108)
CO2 SERPL-SCNC: 26 MMOL/L (ref 21–32)
CREAT SERPL-MCNC: 0.84 MG/DL (ref 0.6–1.3)
EOSINOPHIL # BLD AUTO: 0.34 THOUSAND/ΜL (ref 0–0.61)
EOSINOPHIL NFR BLD AUTO: 6 % (ref 0–6)
ERYTHROCYTE [DISTWIDTH] IN BLOOD BY AUTOMATED COUNT: 12.8 % (ref 11.6–15.1)
GFR SERPL CREATININE-BSD FRML MDRD: 95 ML/MIN/1.73SQ M
GLUCOSE SERPL-MCNC: 102 MG/DL (ref 65–140)
HCT VFR BLD AUTO: 44.9 % (ref 36.5–49.3)
HGB BLD-MCNC: 14.9 G/DL (ref 12–17)
IMM GRANULOCYTES # BLD AUTO: 0.01 THOUSAND/UL (ref 0–0.2)
IMM GRANULOCYTES NFR BLD AUTO: 0 % (ref 0–2)
LIPASE SERPL-CCNC: 126 U/L (ref 73–393)
LYMPHOCYTES # BLD AUTO: 1.52 THOUSANDS/ΜL (ref 0.6–4.47)
LYMPHOCYTES NFR BLD AUTO: 26 % (ref 14–44)
MCH RBC QN AUTO: 30.8 PG (ref 26.8–34.3)
MCHC RBC AUTO-ENTMCNC: 33.2 G/DL (ref 31.4–37.4)
MCV RBC AUTO: 93 FL (ref 82–98)
MONOCYTES # BLD AUTO: 0.72 THOUSAND/ΜL (ref 0.17–1.22)
MONOCYTES NFR BLD AUTO: 12 % (ref 4–12)
NEUTROPHILS # BLD AUTO: 3.15 THOUSANDS/ΜL (ref 1.85–7.62)
NEUTS SEG NFR BLD AUTO: 55 % (ref 43–75)
NRBC BLD AUTO-RTO: 0 /100 WBCS
PLATELET # BLD AUTO: 213 THOUSANDS/UL (ref 149–390)
PMV BLD AUTO: 9.4 FL (ref 8.9–12.7)
POTASSIUM SERPL-SCNC: 3.6 MMOL/L (ref 3.5–5.3)
PROT SERPL-MCNC: 7.5 G/DL (ref 6.4–8.4)
RBC # BLD AUTO: 4.83 MILLION/UL (ref 3.88–5.62)
SODIUM SERPL-SCNC: 141 MMOL/L (ref 135–147)
WBC # BLD AUTO: 5.79 THOUSAND/UL (ref 4.31–10.16)

## 2022-08-25 PROCEDURE — 93005 ELECTROCARDIOGRAM TRACING: CPT

## 2022-08-25 PROCEDURE — 80053 COMPREHEN METABOLIC PANEL: CPT | Performed by: EMERGENCY MEDICINE

## 2022-08-25 PROCEDURE — 83690 ASSAY OF LIPASE: CPT | Performed by: EMERGENCY MEDICINE

## 2022-08-25 PROCEDURE — 96374 THER/PROPH/DIAG INJ IV PUSH: CPT

## 2022-08-25 PROCEDURE — 96375 TX/PRO/DX INJ NEW DRUG ADDON: CPT

## 2022-08-25 PROCEDURE — 99284 EMERGENCY DEPT VISIT MOD MDM: CPT

## 2022-08-25 PROCEDURE — 84484 ASSAY OF TROPONIN QUANT: CPT | Performed by: EMERGENCY MEDICINE

## 2022-08-25 PROCEDURE — G1004 CDSM NDSC: HCPCS

## 2022-08-25 PROCEDURE — 74177 CT ABD & PELVIS W/CONTRAST: CPT

## 2022-08-25 PROCEDURE — 99285 EMERGENCY DEPT VISIT HI MDM: CPT | Performed by: PHYSICIAN ASSISTANT

## 2022-08-25 PROCEDURE — 85025 COMPLETE CBC W/AUTO DIFF WBC: CPT | Performed by: EMERGENCY MEDICINE

## 2022-08-25 PROCEDURE — 36415 COLL VENOUS BLD VENIPUNCTURE: CPT

## 2022-08-25 RX ORDER — METHOCARBAMOL 500 MG/1
500 TABLET, FILM COATED ORAL 2 TIMES DAILY
Qty: 20 TABLET | Refills: 0 | Status: SHIPPED | OUTPATIENT
Start: 2022-08-25

## 2022-08-25 RX ORDER — LIDOCAINE 50 MG/G
1 PATCH TOPICAL ONCE
Status: DISCONTINUED | OUTPATIENT
Start: 2022-08-25 | End: 2022-08-25 | Stop reason: HOSPADM

## 2022-08-25 RX ORDER — ONDANSETRON 2 MG/ML
4 INJECTION INTRAMUSCULAR; INTRAVENOUS ONCE
Status: COMPLETED | OUTPATIENT
Start: 2022-08-25 | End: 2022-08-25

## 2022-08-25 RX ORDER — MORPHINE SULFATE 4 MG/ML
4 INJECTION, SOLUTION INTRAMUSCULAR; INTRAVENOUS ONCE
Status: COMPLETED | OUTPATIENT
Start: 2022-08-25 | End: 2022-08-25

## 2022-08-25 RX ORDER — METHOCARBAMOL 500 MG/1
500 TABLET, FILM COATED ORAL ONCE
Status: COMPLETED | OUTPATIENT
Start: 2022-08-25 | End: 2022-08-25

## 2022-08-25 RX ADMIN — LIDOCAINE 5% 1 PATCH: 700 PATCH TOPICAL at 06:35

## 2022-08-25 RX ADMIN — ONDANSETRON 4 MG: 2 INJECTION INTRAMUSCULAR; INTRAVENOUS at 05:17

## 2022-08-25 RX ADMIN — IOHEXOL 70 ML: 350 INJECTION, SOLUTION INTRAVENOUS at 05:09

## 2022-08-25 RX ADMIN — MORPHINE SULFATE 4 MG: 4 INJECTION INTRAVENOUS at 05:18

## 2022-08-25 RX ADMIN — METHOCARBAMOL 500 MG: 500 TABLET ORAL at 06:34

## 2022-08-25 NOTE — ED PROVIDER NOTES
History  Chief Complaint   Patient presents with    Abdominal Pain     RUQ abdominal pain starting yesterday  patient reports initially being suspicious of a pulled muscle as they were moving furniture however, patient reports increased RUQ abdominal tenderness and pain today  Reports nausea but no vomiting  Patient is a 29-year-old male with a past medical history significant for hypertension, hyperlipidemia presents to the emergency department for evaluation of right upper quadrant abdominal pain, nausea that started last night  Pain comes and goes  10/10 in severity  Currently feeling mildly improved  No associated vomiting  No previous abdominal surgeries  No fevers, chills  Pain does occasionally radiate up into his chest and back  No other complaints at this time  Prior to Admission Medications   Prescriptions Last Dose Informant Patient Reported? Taking? Zinc 100 MG TABS  Self Yes No   Sig: Take by mouth daily    amLODIPine (NORVASC) 5 mg tablet   No No   Sig: TAKE 1 TABLET BY MOUTH EVERY DAY   ascorbic acid (VITAMIN C) 250 mg tablet  Self Yes No   Sig: Take 750 mg by mouth daily    cyanocobalamin (VITAMIN B-12) 100 MCG tablet  Self Yes No   Sig: Take 250 mcg by mouth daily   multivitamin (THERAGRAN) TABS  Self Yes No   Sig: Take 1 tablet by mouth daily   pregabalin (LYRICA) 75 mg capsule   No No   Sig: Take 1 capsule (75 mg total) by mouth 2 (two) times a day   valsartan-hydrochlorothiazide (DIOVAN-HCT) 320-25 MG per tablet   No No   Sig: Take 1 tablet by mouth daily      Facility-Administered Medications: None       Past Medical History:   Diagnosis Date    Hand pain     Headache     Hyperlipidemia     Neuropathy        Past Surgical History:   Procedure Laterality Date    NO PAST SURGERIES         Family History   Problem Relation Age of Onset    Breast cancer Mother     ALS Father      I have reviewed and agree with the history as documented      E-Cigarette/Vaping E-Cigarette/Vaping Substances     Social History     Tobacco Use    Smoking status: Current Some Day Smoker     Packs/day: 1 00     Years: 14 00     Pack years: 14 00     Types: Cigars     Last attempt to quit:      Years since quittin 6    Smokeless tobacco: Never Used   Substance Use Topics    Alcohol use: Yes     Alcohol/week: 3 0 - 5 0 standard drinks     Types: 3 - 5 Cans of beer per week     Comment: 2 beers a day    Drug use: Yes     Types: Marijuana     Comment: occasionally       Review of Systems   Constitutional: Negative for chills and fever  HENT: Negative for congestion, drooling, facial swelling, nosebleeds, sore throat and voice change  Eyes: Negative for discharge and redness  Respiratory: Negative for cough, choking, chest tightness, shortness of breath and stridor  Cardiovascular: Positive for chest pain  Negative for palpitations  Gastrointestinal: Positive for abdominal pain and nausea  Negative for diarrhea and vomiting  Musculoskeletal: Negative for arthralgias, back pain, neck pain and neck stiffness  Skin: Negative for color change, rash and wound  Neurological: Negative for dizziness, syncope, facial asymmetry, weakness, light-headedness, numbness and headaches  Psychiatric/Behavioral: Negative for confusion and suicidal ideas  The patient is not nervous/anxious  All other systems reviewed and are negative  Physical Exam  Physical Exam  Vitals reviewed  Constitutional:       General: He is not in acute distress  Appearance: Normal appearance  He is normal weight  He is not ill-appearing, toxic-appearing or diaphoretic  HENT:      Head: Normocephalic and atraumatic  Right Ear: External ear normal       Left Ear: External ear normal       Mouth/Throat:      Mouth: Mucous membranes are moist       Pharynx: Oropharynx is clear  No oropharyngeal exudate or posterior oropharyngeal erythema  Eyes:      General: No scleral icterus  Right eye: No discharge  Left eye: No discharge  Extraocular Movements: Extraocular movements intact  Conjunctiva/sclera: Conjunctivae normal    Cardiovascular:      Rate and Rhythm: Normal rate and regular rhythm  Pulses: Normal pulses  Heart sounds: Normal heart sounds  No murmur heard  No friction rub  No gallop  Pulmonary:      Effort: Pulmonary effort is normal  No respiratory distress  Breath sounds: Normal breath sounds  No stridor  No wheezing, rhonchi or rales  Abdominal:      General: Abdomen is flat  Palpations: Abdomen is soft  Tenderness: There is abdominal tenderness in the right upper quadrant  There is guarding  There is no rebound  Musculoskeletal:         General: Normal range of motion  Cervical back: Normal range of motion and neck supple  Right lower leg: No edema  Left lower leg: No edema  Skin:     General: Skin is warm and dry  Capillary Refill: Capillary refill takes less than 2 seconds  Neurological:      General: No focal deficit present  Mental Status: He is alert and oriented to person, place, and time     Psychiatric:         Mood and Affect: Mood normal          Behavior: Behavior normal          Vital Signs  ED Triage Vitals   Temperature Pulse Respirations Blood Pressure SpO2   08/25/22 0217 08/25/22 0217 08/25/22 0217 08/25/22 0217 08/25/22 0217   98 2 °F (36 8 °C) 70 18 137/96 99 %      Temp Source Heart Rate Source Patient Position - Orthostatic VS BP Location FiO2 (%)   08/25/22 0217 08/25/22 0217 08/25/22 0217 08/25/22 0217 --   Oral Monitor Sitting Left arm       Pain Score       08/25/22 0518       9           Vitals:    08/25/22 0217 08/25/22 0430 08/25/22 0530   BP: 137/96 145/77 134/69   Pulse: 70 60 55   Patient Position - Orthostatic VS: Sitting  Lying         Visual Acuity      ED Medications  Medications   lidocaine (LIDODERM) 5 % patch 1 patch (1 patch Topical Medication Applied 8/25/22 7880)   morphine injection 4 mg (4 mg Intravenous Given 8/25/22 0518)   ondansetron (ZOFRAN) injection 4 mg (4 mg Intravenous Given 8/25/22 0517)   iohexol (OMNIPAQUE) 350 MG/ML injection (MULTI-DOSE) 70 mL (70 mL Intravenous Given 8/25/22 8818)   methocarbamol (ROBAXIN) tablet 500 mg (500 mg Oral Given 8/25/22 0634)       Diagnostic Studies  Results Reviewed     Procedure Component Value Units Date/Time    HS Troponin I 2hr [[id number]]  (Normal) Collected: 08/25/22 0420    Lab Status: Final result Specimen: Blood from Arm, Right Updated: 08/25/22 0520     hs TnI 2hr 6 ng/L      Delta 2hr hsTnI 1 ng/L     HS Troponin I 4hr [[id number]]     Lab Status: No result Specimen: Blood     HS Troponin 0hr (reflex protocol) [[id number]]  (Normal) Collected: 08/25/22 0223    Lab Status: Final result Specimen: Blood from Arm, Right Updated: 08/25/22 0257     hs TnI 0hr 5 ng/L     Comprehensive metabolic panel [[id number]] Collected: 08/25/22 0223    Lab Status: Final result Specimen: Blood from Arm, Right Updated: 08/25/22 0250     Sodium 141 mmol/L      Potassium 3 6 mmol/L      Chloride 107 mmol/L      CO2 26 mmol/L      ANION GAP 8 mmol/L      BUN 9 mg/dL      Creatinine 0 84 mg/dL      Glucose 102 mg/dL      Calcium 8 5 mg/dL      AST 19 U/L      ALT 30 U/L      Alkaline Phosphatase 92 U/L      Total Protein 7 5 g/dL      Albumin 3 8 g/dL      Total Bilirubin 0 25 mg/dL      eGFR 95 ml/min/1 73sq m     Narrative:      [place] guidelines for Chronic Kidney Disease (CKD):     Stage 1 with normal or high GFR (GFR > 90 mL/min/1 73 square meters)    Stage 2 Mild CKD (GFR = 60-89 mL/min/1 73 square meters)    Stage 3A Moderate CKD (GFR = 45-59 mL/min/1 73 square meters)    Stage 3B Moderate CKD (GFR = 30-44 mL/min/1 73 square meters)    Stage 4 Severe CKD (GFR = 15-29 mL/min/1 73 square meters)    Stage 5 End Stage CKD (GFR <15 mL/min/1 73 square meters)  Note: GFR calculation is accurate only with a steady state creatinine    Lipase [[id number]]  (Normal) Collected: 08/25/22 0223    Lab Status: Final result Specimen: Blood from Arm, Right Updated: 08/25/22 0250     Lipase 126 u/L     CBC and differential [[id number]] Collected: 08/25/22 0223    Lab Status: Final result Specimen: Blood from Arm, Right Updated: 08/25/22 0229     WBC 5 79 Thousand/uL      RBC 4 83 Million/uL      Hemoglobin 14 9 g/dL      Hematocrit 44 9 %      MCV 93 fL      MCH 30 8 pg      MCHC 33 2 g/dL      RDW 12 8 %      MPV 9 4 fL      Platelets 893 Thousands/uL      nRBC 0 /100 WBCs      Neutrophils Relative 55 %      Immat GRANS % 0 %      Lymphocytes Relative 26 %      Monocytes Relative 12 %      Eosinophils Relative 6 %      Basophils Relative 1 %      Neutrophils Absolute 3 15 Thousands/µL      Immature Grans Absolute 0 01 Thousand/uL      Lymphocytes Absolute 1 52 Thousands/µL      Monocytes Absolute 0 72 Thousand/µL      Eosinophils Absolute 0 34 Thousand/µL      Basophils Absolute 0 05 Thousands/µL                  CT abdomen pelvis with contrast   Final Result by Mariela Jensen MD (08/25 3556)      No acute intra-abdominal abnormality  No free air or free fluid  Scattered colonic diverticulosis with no inflammatory changes present to suggest acute diverticulitis  Workstation performed: [id number]                    Procedures  Procedures         ED Course                               SBIRT 20yo+    Flowsheet Row Most Recent Value   SBIRT (25 yo +)    In order to provide better care to our patients, we are screening all of our patients for alcohol and drug use  Would it be okay to ask you these screening questions? Yes Filed at: 08/25/2022 8955   Initial Alcohol Screen: US AUDIT-C     1  How often do you have a drink containing alcohol? 6 Filed at: 08/25/2022 0635   2  How many drinks containing alcohol do you have on a typical day you are drinking? 1 Filed at: 08/25/2022 0635   3a  Male UNDER 65:  How often do you have five or more drinks on one occasion? 0 Filed at: 08/25/2022 0825   Audit-C Score 7 Filed at: 08/25/2022 0230   KHADIJAH: How many times in the past year have you    Used an illegal drug or used a prescription medication for non-medical reasons? Never Filed at: 08/25/2022 7176                    [facility]  Number of Diagnoses or Management Options  Right upper quadrant abdominal pain  Diagnosis management comments: Patient presenting for evaluation of right upper quadrant abdominal pain and nausea  He appears comfortable on arrival   Vital signs unremarkable  He has right upper quadrant abdominal tenderness with guarding on exam   Lab work reassuring  CT of the abdomen pelvis without any acute pathology  He was discharged home with instructions to follow-up with his primary care provider as well as Gastroenterology  Strict return precautions were discussed  He is in stable condition at time of discharge  Amount and/or Complexity of Data Reviewed  Clinical lab tests: ordered and reviewed  Tests in the radiology section of CPT®: ordered and reviewed    Patient Progress  Patient progress: stable      Disposition  Final diagnoses:   Right upper quadrant abdominal pain     Time reflects when diagnosis was documented in both MDM as applicable and the Disposition within this note     Time User Action Codes Description Comment    8/25/2022  6:22 AM Sj Pabon Add [R10 11] Right upper quadrant abdominal pain       ED Disposition     ED Disposition   Discharge    Condition   Stable    Date/Time   Thu Aug 25, 2022  6:22 AM    Kristine Jefferson discharge to home/self care                 Follow-up Information     Follow up With Specialties Details Why Contact Info Additional 2000 [facility] Emergency Medicine Go to  If symptoms worsen 34 [facility] [id number] [facility] Emergency Department, [facility], [address] [place] MD Internal Medicine Schedule an appointment as soon as possible for a visit  for follow up 6000 [facility] [id number] [place] [address] 145 [facility] Gastroenterology Schedule an appointment as soon as possible for a visit  For follow up [address]  [place] [address] [phone] Jad Farr [facility], [address] [facility] [address] [facility], [facility] [address], [facility], [address] [phone]           Patient's Medications   Discharge Prescriptions    METHOCARBAMOL (ROBAXIN) 500 MG TABLET    Take 1 tablet (500 mg total) by mouth 2 (two) times a day       Start Date: 8/25/2022 End Date: --       Order Dose: 500 mg       Quantity: 20 tablet    Refills: 0       No discharge procedures on file      PDMP Review     None          ED Provider  Electronically Signed by           Emeka Lara PA-C  08/25/22 4185

## 2022-08-26 LAB
ATRIAL RATE: 77 BPM
P AXIS: 51 DEGREES
PR INTERVAL: 164 MS
QRS AXIS: 78 DEGREES
QRSD INTERVAL: 90 MS
QT INTERVAL: 406 MS
QTC INTERVAL: 459 MS
T WAVE AXIS: 54 DEGREES
VENTRICULAR RATE: 77 BPM

## 2022-08-26 PROCEDURE — 93010 ELECTROCARDIOGRAM REPORT: CPT | Performed by: INTERNAL MEDICINE

## 2022-09-12 ENCOUNTER — APPOINTMENT (OUTPATIENT)
Dept: LAB | Facility: CLINIC | Age: 62
End: 2022-09-12
Payer: COMMERCIAL

## 2022-09-12 ENCOUNTER — OFFICE VISIT (OUTPATIENT)
Dept: INTERNAL MEDICINE CLINIC | Facility: CLINIC | Age: 62
End: 2022-09-12
Payer: COMMERCIAL

## 2022-09-12 VITALS
OXYGEN SATURATION: 100 % | BODY MASS INDEX: 31.61 KG/M2 | SYSTOLIC BLOOD PRESSURE: 128 MMHG | DIASTOLIC BLOOD PRESSURE: 72 MMHG | HEART RATE: 78 BPM | WEIGHT: 225.8 LBS | TEMPERATURE: 98.2 F | HEIGHT: 71 IN

## 2022-09-12 DIAGNOSIS — E78.2 COMBINED HYPERLIPIDEMIA: ICD-10-CM

## 2022-09-12 DIAGNOSIS — Z12.11 COLON CANCER SCREENING: ICD-10-CM

## 2022-09-12 DIAGNOSIS — N52.9 ERECTILE DYSFUNCTION, UNSPECIFIED ERECTILE DYSFUNCTION TYPE: ICD-10-CM

## 2022-09-12 DIAGNOSIS — I11.9 HYPERTENSIVE HEART DISEASE WITHOUT HEART FAILURE: ICD-10-CM

## 2022-09-12 DIAGNOSIS — Z00.00 HEALTHCARE MAINTENANCE: ICD-10-CM

## 2022-09-12 DIAGNOSIS — Z12.5 PROSTATE CANCER SCREENING: ICD-10-CM

## 2022-09-12 DIAGNOSIS — I10 ESSENTIAL HYPERTENSION: Primary | ICD-10-CM

## 2022-09-12 PROBLEM — B02.9 HERPES ZOSTER WITHOUT COMPLICATION: Status: RESOLVED | Noted: 2018-07-07 | Resolved: 2022-09-12

## 2022-09-12 PROCEDURE — 87389 HIV-1 AG W/HIV-1&-2 AB AG IA: CPT

## 2022-09-12 PROCEDURE — 36415 COLL VENOUS BLD VENIPUNCTURE: CPT

## 2022-09-12 PROCEDURE — 99396 PREV VISIT EST AGE 40-64: CPT | Performed by: INTERNAL MEDICINE

## 2022-09-12 PROCEDURE — G0103 PSA SCREENING: HCPCS

## 2022-09-12 PROCEDURE — 80061 LIPID PANEL: CPT

## 2022-09-12 PROCEDURE — 3725F SCREEN DEPRESSION PERFORMED: CPT | Performed by: INTERNAL MEDICINE

## 2022-09-12 RX ORDER — SILDENAFIL 50 MG/1
50 TABLET, FILM COATED ORAL DAILY PRN
Qty: 10 TABLET | Refills: 5 | Status: SHIPPED | OUTPATIENT
Start: 2022-09-12

## 2022-09-12 NOTE — PROGRESS NOTES
Assessment/Plan:       Diagnoses and all orders for this visit:    Essential hypertension    Hypertensive heart disease without heart failure    Combined hyperlipidemia    Healthcare maintenance  -     HIV 1/2 Antigen/Antibody (4th Generation) w Reflex [facility]; Future  -     Occult Blood, Fecal Immunochemical; Future    Prostate cancer screening  -     PSA, Total Screen; Future  -     Lipid Panel with Direct LDL reflex; Future    Colon cancer screening  -     Occult Blood, Fecal Immunochemical; Future                Subjective:      Patient ID: Oxana Heck is a 63 y o  male  A 61-year-old male with hypertension and hypertensive heart disease but well controlled with no symptoms     He comes back after having moved to [facility] for several years  He returns because he could not stand the [place] aspect of the weather     a few weeks ago he went to the emergency room with a right upper quadrant pain  He was examined in scanned and discharged with no acute diagnosis  This pain has not recurred  I reviewed the CT scan; it was normal     This could have been musculoskeletal pain or alternatively could of been gallbladder pain  Fortunately, no bad lesions of any kind noted in CT  Nonsmoker   The is a 5 beers a day  No illicit drugs  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; he intends to get back to work in the relatively near future  Has never done a colonoscopy and does not want to  We will do a Hemoccult test of the stool  He would like Viagra      The following portions of the patient's history were reviewed and updated as appropriate:   He has a past medical history of Hand pain, Headache, Hyperlipidemia, and Neuropathy  ,  does not have any pertinent problems on file  ,   has a past surgical history that includes No past surgeries  ,  family history includes ALS in his father; Breast cancer in his mother  ,   reports that he has been smoking cigars   He has a 14 00 pack-year smoking history  He has never used smokeless tobacco  He reports current alcohol use of about 3 0 - 5 0 standard drinks of alcohol per week  He reports current drug use  Drug: Marijuana  ,  is allergic to nsaids, penicillins, and ibuprofen     Current Outpatient Medications   Medication Sig Dispense Refill    amLODIPine (NORVASC) 5 mg tablet TAKE 1 TABLET BY MOUTH EVERY DAY 90 tablet 0    ascorbic acid (VITAMIN C) 250 mg tablet Take 750 mg by mouth daily       cyanocobalamin (VITAMIN B-12) 100 MCG tablet Take 250 mcg by mouth daily      methocarbamol (ROBAXIN) 500 mg tablet Take 1 tablet (500 mg total) by mouth 2 (two) times a day 20 tablet 0    multivitamin (THERAGRAN) TABS Take 1 tablet by mouth daily      valsartan-hydrochlorothiazide (DIOVAN-HCT) 320-25 MG per tablet Take 1 tablet by mouth daily 90 tablet 2    Zinc 100 MG TABS Take by mouth daily       pregabalin (LYRICA) 75 mg capsule Take 1 capsule (75 mg total) by mouth 2 (two) times a day 60 capsule 1     No current facility-administered medications for this visit  Review of Systems   Constitutional: Negative for chills and fever  HENT: Negative for ear pain and sore throat  Eyes: Negative for pain and visual disturbance  Respiratory: Negative for cough and shortness of breath  Cardiovascular: Negative for chest pain and palpitations  Gastrointestinal: Negative for abdominal pain and vomiting  Genitourinary: Negative for dysuria and hematuria  Musculoskeletal: Negative for arthralgias and back pain  Skin: Negative for color change and rash  Neurological: Negative for seizures and syncope  All other systems reviewed and are negative  Objective:  Vitals:    09/12/22 1455   BP: 128/72   Pulse: 78   Temp: 98 2 °F (36 8 °C)   SpO2: 100%      Physical Exam  Constitutional:       Appearance: He is well-developed  HENT:      Head: Normocephalic and atraumatic     Eyes:      Pupils: Pupils are equal, round, and reactive to light  Neck:      Thyroid: No thyromegaly  Trachea: No tracheal deviation  Cardiovascular:      Rate and Rhythm: Normal rate and regular rhythm  Heart sounds: Normal heart sounds  No murmur heard  No gallop  Pulmonary:      Effort: Pulmonary effort is normal  No respiratory distress  Breath sounds: No wheezing or rales  Abdominal:      General: Bowel sounds are normal       Palpations: Abdomen is soft  Tenderness: There is no abdominal tenderness  Musculoskeletal:         General: No tenderness or deformity  Normal range of motion  Cervical back: Normal range of motion and neck supple  Skin:     General: Skin is warm and dry  Neurological:      Mental Status: He is alert and oriented to person, place, and time  Coordination: Coordination normal       Deep Tendon Reflexes: Reflexes are normal and symmetric  There are no Patient Instructions on file for this visit

## 2022-09-13 ENCOUNTER — TELEPHONE (OUTPATIENT)
Dept: INTERNAL MEDICINE CLINIC | Facility: CLINIC | Age: 62
End: 2022-09-13

## 2022-09-13 DIAGNOSIS — E78.2 MIXED HYPERLIPIDEMIA: Primary | ICD-10-CM

## 2022-09-13 LAB
CHOLEST SERPL-MCNC: 214 MG/DL
HDLC SERPL-MCNC: 44 MG/DL
HIV 1+2 AB+HIV1 P24 AG SERPL QL IA: NORMAL
LDLC SERPL CALC-MCNC: 152 MG/DL (ref 0–100)
PSA SERPL-MCNC: 3 NG/ML (ref 0–4)
TRIGL SERPL-MCNC: 89 MG/DL

## 2022-09-13 RX ORDER — ROSUVASTATIN CALCIUM 20 MG/1
20 TABLET, COATED ORAL DAILY
Qty: 90 TABLET | Refills: 3 | Status: SHIPPED | OUTPATIENT
Start: 2022-09-13 | End: 2022-12-13

## 2022-09-13 NOTE — TELEPHONE ENCOUNTER
----- Message from Edwin Hernández MD sent at 9/13/2022  7:39 AM EDT -----  Cholesterol very much higher than desirable should be on Crestor 20 mg daily which has been sent to his pharmacy

## 2022-12-29 DIAGNOSIS — I10 ESSENTIAL HYPERTENSION: ICD-10-CM

## 2022-12-29 RX ORDER — VALSARTAN AND HYDROCHLOROTHIAZIDE 320; 25 MG/1; MG/1
1 TABLET, FILM COATED ORAL DAILY
Qty: 90 TABLET | Refills: 0 | Status: SHIPPED | OUTPATIENT
Start: 2022-12-29 | End: 2023-01-03 | Stop reason: SDUPTHER

## 2022-12-29 RX ORDER — AMLODIPINE BESYLATE 5 MG/1
5 TABLET ORAL DAILY
Qty: 90 TABLET | Refills: 0 | Status: SHIPPED | OUTPATIENT
Start: 2022-12-29 | End: 2023-01-03 | Stop reason: SDUPTHER

## 2022-12-29 NOTE — TELEPHONE ENCOUNTER
Medications were sent to the wrong Pharmacy   Please send the     valsartan-hydrochlorothiazide (DIOVAN-HCT) 320-25 MG per tablet     amLODIPine (NORVASC) 5 mg tablet     To [address] [place] [address]

## 2023-01-03 RX ORDER — VALSARTAN AND HYDROCHLOROTHIAZIDE 320; 25 MG/1; MG/1
1 TABLET, FILM COATED ORAL DAILY
Qty: 90 TABLET | Refills: 1 | Status: SHIPPED | OUTPATIENT
Start: 2023-01-03

## 2023-01-03 RX ORDER — AMLODIPINE BESYLATE 5 MG/1
5 TABLET ORAL DAILY
Qty: 90 TABLET | Refills: 1 | Status: SHIPPED | OUTPATIENT
Start: 2023-01-03

## 2023-01-04 DIAGNOSIS — I10 ESSENTIAL HYPERTENSION: ICD-10-CM

## 2023-01-04 RX ORDER — VALSARTAN AND HYDROCHLOROTHIAZIDE 320; 25 MG/1; MG/1
1 TABLET, FILM COATED ORAL DAILY
Qty: 90 TABLET | Refills: 0 | OUTPATIENT
Start: 2023-01-04

## 2023-01-04 RX ORDER — AMLODIPINE BESYLATE 5 MG/1
5 TABLET ORAL DAILY
Qty: 90 TABLET | Refills: 0 | OUTPATIENT
Start: 2023-01-04

## 2023-09-02 DIAGNOSIS — I10 ESSENTIAL HYPERTENSION: ICD-10-CM

## 2023-09-02 RX ORDER — AMLODIPINE BESYLATE 5 MG/1
5 TABLET ORAL DAILY
Qty: 90 TABLET | Refills: 0 | Status: SHIPPED | OUTPATIENT
Start: 2023-09-02

## 2023-10-09 DIAGNOSIS — I10 ESSENTIAL HYPERTENSION: ICD-10-CM

## 2023-10-09 RX ORDER — VALSARTAN AND HYDROCHLOROTHIAZIDE 320; 25 MG/1; MG/1
1 TABLET, FILM COATED ORAL DAILY
Qty: 30 TABLET | Refills: 0 | Status: SHIPPED | OUTPATIENT
Start: 2023-10-09

## 2023-11-06 DIAGNOSIS — I10 ESSENTIAL HYPERTENSION: ICD-10-CM

## 2023-11-07 RX ORDER — VALSARTAN AND HYDROCHLOROTHIAZIDE 320; 25 MG/1; MG/1
1 TABLET, FILM COATED ORAL DAILY
Qty: 30 TABLET | Refills: 0 | Status: SHIPPED | OUTPATIENT
Start: 2023-11-07

## 2023-11-07 RX ORDER — AMLODIPINE BESYLATE 5 MG/1
5 TABLET ORAL DAILY
Qty: 90 TABLET | Refills: 0 | Status: SHIPPED | OUTPATIENT
Start: 2023-11-07

## 2024-01-05 ENCOUNTER — APPOINTMENT (OUTPATIENT)
Dept: RADIOLOGY | Facility: CLINIC | Age: 64
End: 2024-01-05
Payer: COMMERCIAL

## 2024-01-05 ENCOUNTER — OFFICE VISIT (OUTPATIENT)
Dept: FAMILY MEDICINE CLINIC | Facility: CLINIC | Age: 64
End: 2024-01-05
Payer: COMMERCIAL

## 2024-01-05 VITALS
TEMPERATURE: 97.9 F | BODY MASS INDEX: 32.62 KG/M2 | DIASTOLIC BLOOD PRESSURE: 90 MMHG | SYSTOLIC BLOOD PRESSURE: 150 MMHG | HEIGHT: 71 IN | OXYGEN SATURATION: 97 % | WEIGHT: 233 LBS | HEART RATE: 70 BPM

## 2024-01-05 DIAGNOSIS — R09.81 HEAD CONGESTION: ICD-10-CM

## 2024-01-05 DIAGNOSIS — Z13.1 SCREENING FOR DIABETES MELLITUS: ICD-10-CM

## 2024-01-05 DIAGNOSIS — I10 ESSENTIAL HYPERTENSION: Primary | ICD-10-CM

## 2024-01-05 DIAGNOSIS — Z12.5 SCREENING FOR PROSTATE CANCER: ICD-10-CM

## 2024-01-05 DIAGNOSIS — E78.2 COMBINED HYPERLIPIDEMIA: ICD-10-CM

## 2024-01-05 DIAGNOSIS — R07.81 RIB PAIN ON RIGHT SIDE: ICD-10-CM

## 2024-01-05 DIAGNOSIS — R53.83 OTHER FATIGUE: ICD-10-CM

## 2024-01-05 PROBLEM — R21 RASH: Status: RESOLVED | Noted: 2019-08-13 | Resolved: 2024-01-05

## 2024-01-05 PROBLEM — G62.9 NEUROPATHY: Status: RESOLVED | Noted: 2018-02-17 | Resolved: 2024-01-05

## 2024-01-05 PROBLEM — M12.9 ARTHROPATHY: Status: RESOLVED | Noted: 2018-12-11 | Resolved: 2024-01-05

## 2024-01-05 PROCEDURE — 71101 X-RAY EXAM UNILAT RIBS/CHEST: CPT

## 2024-01-05 PROCEDURE — 99203 OFFICE O/P NEW LOW 30 MIN: CPT | Performed by: FAMILY MEDICINE

## 2024-01-05 RX ORDER — FLUTICASONE PROPIONATE 50 MCG
1 SPRAY, SUSPENSION (ML) NASAL DAILY
Qty: 11.1 ML | Refills: 1 | Status: SHIPPED | OUTPATIENT
Start: 2024-01-05

## 2024-01-05 RX ORDER — LORATADINE 10 MG/1
10 TABLET ORAL DAILY
Qty: 10 TABLET | Refills: 1 | Status: SHIPPED | OUTPATIENT
Start: 2024-01-05

## 2024-01-05 RX ORDER — VALSARTAN AND HYDROCHLOROTHIAZIDE 320; 25 MG/1; MG/1
1 TABLET, FILM COATED ORAL DAILY
Qty: 90 TABLET | Refills: 3 | Status: SHIPPED | OUTPATIENT
Start: 2024-01-05

## 2024-01-05 RX ORDER — AMLODIPINE BESYLATE 5 MG/1
5 TABLET ORAL DAILY
Qty: 30 TABLET | Refills: 3 | Status: SHIPPED | OUTPATIENT
Start: 2024-01-05

## 2024-01-05 NOTE — PROGRESS NOTES
Name: Victor Hugo Bennett      : 1960      MRN: [id number]  Encounter Provider: Baldomero Pizarro MD  Encounter Date: 2024   Encounter department: [facility]    Assessment & Plan     1. Essential hypertension  Elevated today  Advise to come back for a BP check    2. Combined hyperlipidemia  -     Lipid panel; Future    3. Screening for diabetes mellitus  -     Comprehensive metabolic panel; Future    4. Screening for prostate cancer  -     PSA, Total Screen; Future    5. Other fatigue  -     CBC and differential; Future    6. Rib pain on right side  -     XR ribs right w pa chest min 3 views; Future; Expected date: 2024    Follow up in 1-2 weeks for a BP check       Subjective     Patient is here to establish care.  He has a history of HTN. Takes his medications daily no side effects. Not a smoker.  Has been having Rib lower rib pain he went to the ER back on 2022 due to a pull while reaching over head on this area.  Also has head congestion.      Review of Systems   Constitutional:  Negative for activity change, appetite change, fatigue and fever.   HENT:  Negative for congestion and ear discharge.    Respiratory:  Negative for cough and shortness of breath.    Cardiovascular:  Negative for chest pain and palpitations.   Gastrointestinal:  Negative for diarrhea and nausea.   Musculoskeletal:  Negative for arthralgias and back pain.   Skin:  Negative for color change and rash.   Neurological:  Negative for dizziness and headaches.   Psychiatric/Behavioral:  Negative for agitation and behavioral problems.        Past Medical History:   Diagnosis Date    Hand pain     Headache     Hyperlipidemia     Neuropathy      Past Surgical History:   Procedure Laterality Date    NO PAST SURGERIES       Family History   Problem Relation Age of Onset    Breast cancer Mother     ALS Father      Social History     Socioeconomic History    Marital status: /Civil Union     Spouse name: None     Number of children: None    Years of education: None    Highest education level: None   Occupational History    None   Tobacco Use    Smoking status: Some Days     Current packs/day: 0.00     Average packs/day: 1 pack/day for 14.0 years (14.0 ttl pk-yrs)     Types: Cigars, Cigarettes     Start date:      Last attempt to quit:      Years since quittin.0    Smokeless tobacco: Never   Substance and Sexual Activity    Alcohol use: Yes     Alcohol/week: 3.0 - 5.0 standard drinks of alcohol     Types: 3 - 5 Cans of beer per week     Comment: 2 beers a day    Drug use: Yes     Types: Marijuana     Comment: occasionally    Sexual activity: Not Currently     Partners: Female   Other Topics Concern    None   Social History Narrative    HETEROSEXUAL    H/O EMOTIONAL ABUSE    INADEQUATE ABUSE     Social Determinants of Health     Financial Resource Strain: Not on file   Food Insecurity: Not on file   Transportation Needs: Not on file   Physical Activity: Inactive (2019)    Exercise Vital Sign     Days of Exercise per Week: 0 days     Minutes of Exercise per Session: 0 min   Stress: Stress Concern Present (2019)    [employer] [place] [employer] - Occupational Stress Questionnaire     Feeling of Stress : To some extent   Social Connections: Not on file   Intimate Partner Violence: Not on file   Housing Stability: Not on file     Current Outpatient Medications on File Prior to Visit   Medication Sig    amLODIPine (NORVASC) 5 mg tablet Take 1 tablet (5 mg total) by mouth daily    methocarbamol (ROBAXIN) 500 mg tablet Take 1 tablet (500 mg total) by mouth 2 (two) times a day    valsartan-hydrochlorothiazide (DIOVAN-HCT) 320-25 MG per tablet TAKE ONE TABLET BY MOUTH ONE TIME DAILY    [DISCONTINUED] ascorbic acid (VITAMIN C) 250 mg tablet Take 750 mg by mouth daily     [DISCONTINUED] cyanocobalamin (VITAMIN B-12) 100 MCG tablet Take 250 mcg by mouth daily    [DISCONTINUED] multivitamin (THERAGRAN)  "TABS Take 1 tablet by mouth daily    [DISCONTINUED] pregabalin (LYRICA) 75 mg capsule Take 1 capsule (75 mg total) by mouth 2 (two) times a day    [DISCONTINUED] sildenafil (VIAGRA) 50 MG tablet Take 1 tablet (50 mg total) by mouth daily as needed for erectile dysfunction    [DISCONTINUED] Zinc 100 MG TABS Take by mouth daily      Allergies   Allergen Reactions    Nsaids Anaphylaxis    Penicillins Anaphylaxis    Ibuprofen Hives     Immunization History   Administered Date(s) Administered    COVID-19 PFIZER VACCINE 0.3 ML IM 03/31/2021, 04/20/2021, 12/06/2021    INFLUENZA 10/20/2016, 10/26/2018    Influenza, seasonal, injectable 10/20/2016       Objective     /90   Pulse 70   Temp 97.9 °F (36.6 °C)   Ht 5' 11\" (1.803 m)   Wt 106 kg (233 lb)   SpO2 97%   BMI 32.50 kg/m²     Physical Exam  Constitutional:       General: He is not in acute distress.     Appearance: He is well-developed. He is not diaphoretic.   Eyes:      General: No scleral icterus.     Pupils: Pupils are equal, round, and reactive to light.   Cardiovascular:      Rate and Rhythm: Normal rate and regular rhythm.      Heart sounds: Normal heart sounds. No murmur heard.  Pulmonary:      Effort: Pulmonary effort is normal. No respiratory distress.      Breath sounds: Normal breath sounds. No wheezing.   Abdominal:      General: Bowel sounds are normal. There is no distension.      Palpations: Abdomen is soft.      Tenderness: There is no abdominal tenderness.   Skin:     General: Skin is warm and dry.      Findings: No rash.   Neurological:      Mental Status: He is alert and oriented to person, place, and time.       Baldomero Pizarro MD    "

## 2024-01-17 ENCOUNTER — APPOINTMENT (OUTPATIENT)
Dept: LAB | Facility: CLINIC | Age: 64
End: 2024-01-17
Payer: COMMERCIAL

## 2024-01-17 ENCOUNTER — TELEPHONE (OUTPATIENT)
Dept: FAMILY MEDICINE CLINIC | Facility: CLINIC | Age: 64
End: 2024-01-17

## 2024-01-17 ENCOUNTER — CLINICAL SUPPORT (OUTPATIENT)
Dept: FAMILY MEDICINE CLINIC | Facility: CLINIC | Age: 64
End: 2024-01-17

## 2024-01-17 VITALS — SYSTOLIC BLOOD PRESSURE: 134 MMHG | DIASTOLIC BLOOD PRESSURE: 82 MMHG

## 2024-01-17 DIAGNOSIS — Z13.1 SCREENING FOR DIABETES MELLITUS: ICD-10-CM

## 2024-01-17 DIAGNOSIS — I10 HYPERTENSION, UNSPECIFIED TYPE: Primary | ICD-10-CM

## 2024-01-17 DIAGNOSIS — E78.2 COMBINED HYPERLIPIDEMIA: ICD-10-CM

## 2024-01-17 DIAGNOSIS — R53.83 OTHER FATIGUE: ICD-10-CM

## 2024-01-17 DIAGNOSIS — Z12.5 SCREENING FOR PROSTATE CANCER: ICD-10-CM

## 2024-01-17 LAB
ALBUMIN SERPL BCP-MCNC: 4.3 G/DL (ref 3.5–5)
ALP SERPL-CCNC: 68 U/L (ref 34–104)
ALT SERPL W P-5'-P-CCNC: 30 U/L (ref 7–52)
ANION GAP SERPL CALCULATED.3IONS-SCNC: 8 MMOL/L
AST SERPL W P-5'-P-CCNC: 20 U/L (ref 13–39)
BASOPHILS # BLD AUTO: 0.07 THOUSANDS/ÂΜL (ref 0–0.1)
BASOPHILS NFR BLD AUTO: 1 % (ref 0–1)
BILIRUB SERPL-MCNC: 0.54 MG/DL (ref 0.2–1)
BUN SERPL-MCNC: 14 MG/DL (ref 5–25)
CALCIUM SERPL-MCNC: 9.2 MG/DL (ref 8.4–10.2)
CHLORIDE SERPL-SCNC: 105 MMOL/L (ref 96–108)
CHOLEST SERPL-MCNC: 194 MG/DL
CO2 SERPL-SCNC: 26 MMOL/L (ref 21–32)
CREAT SERPL-MCNC: 0.81 MG/DL (ref 0.6–1.3)
EOSINOPHIL # BLD AUTO: 0.26 THOUSAND/ÂΜL (ref 0–0.61)
EOSINOPHIL NFR BLD AUTO: 4 % (ref 0–6)
ERYTHROCYTE [DISTWIDTH] IN BLOOD BY AUTOMATED COUNT: 12.9 % (ref 11.6–15.1)
GFR SERPL CREATININE-BSD FRML MDRD: 95 ML/MIN/1.73SQ M
GLUCOSE P FAST SERPL-MCNC: 93 MG/DL (ref 65–99)
HCT VFR BLD AUTO: 46.6 % (ref 36.5–49.3)
HDLC SERPL-MCNC: 41 MG/DL
HGB BLD-MCNC: 15.7 G/DL (ref 12–17)
IMM GRANULOCYTES # BLD AUTO: 0.01 THOUSAND/UL (ref 0–0.2)
IMM GRANULOCYTES NFR BLD AUTO: 0 % (ref 0–2)
LDLC SERPL CALC-MCNC: 134 MG/DL (ref 0–100)
LYMPHOCYTES # BLD AUTO: 1.72 THOUSANDS/ÂΜL (ref 0.6–4.47)
LYMPHOCYTES NFR BLD AUTO: 25 % (ref 14–44)
MCH RBC QN AUTO: 31.3 PG (ref 26.8–34.3)
MCHC RBC AUTO-ENTMCNC: 33.7 G/DL (ref 31.4–37.4)
MCV RBC AUTO: 93 FL (ref 82–98)
MONOCYTES # BLD AUTO: 0.69 THOUSAND/ÂΜL (ref 0.17–1.22)
MONOCYTES NFR BLD AUTO: 10 % (ref 4–12)
NEUTROPHILS # BLD AUTO: 4.05 THOUSANDS/ÂΜL (ref 1.85–7.62)
NEUTS SEG NFR BLD AUTO: 60 % (ref 43–75)
NONHDLC SERPL-MCNC: 153 MG/DL
NRBC BLD AUTO-RTO: 0 /100 WBCS
PLATELET # BLD AUTO: 254 THOUSANDS/UL (ref 149–390)
PMV BLD AUTO: 9.6 FL (ref 8.9–12.7)
POTASSIUM SERPL-SCNC: 3.7 MMOL/L (ref 3.5–5.3)
PROT SERPL-MCNC: 6.4 G/DL (ref 6.4–8.4)
PSA SERPL-MCNC: 4.62 NG/ML (ref 0–4)
RBC # BLD AUTO: 5.01 MILLION/UL (ref 3.88–5.62)
SODIUM SERPL-SCNC: 139 MMOL/L (ref 135–147)
TRIGL SERPL-MCNC: 95 MG/DL
WBC # BLD AUTO: 6.8 THOUSAND/UL (ref 4.31–10.16)

## 2024-01-17 PROCEDURE — 85025 COMPLETE CBC W/AUTO DIFF WBC: CPT

## 2024-01-17 PROCEDURE — 80053 COMPREHEN METABOLIC PANEL: CPT

## 2024-01-17 PROCEDURE — G0103 PSA SCREENING: HCPCS

## 2024-01-17 PROCEDURE — 80061 LIPID PANEL: CPT

## 2024-01-17 PROCEDURE — 36415 COLL VENOUS BLD VENIPUNCTURE: CPT

## 2024-01-17 NOTE — TELEPHONE ENCOUNTER
Patient notified. The patient stated that this reading was actually done before he took his BP medications this morning. He also states that ever since he started taking these medications he has been more drowsy than usual and has experienced bouts of pedal edema. The patient would like to know if that could be a side effect of his BP medications.

## 2024-01-18 DIAGNOSIS — R97.20 ELEVATED PSA: Primary | ICD-10-CM

## 2024-02-27 ENCOUNTER — TELEPHONE (OUTPATIENT)
Dept: UROLOGY | Facility: CLINIC | Age: 64
End: 2024-02-27

## 2024-02-27 ENCOUNTER — TELEPHONE (OUTPATIENT)
Dept: FAMILY MEDICINE CLINIC | Facility: CLINIC | Age: 64
End: 2024-02-27

## 2024-02-27 NOTE — TELEPHONE ENCOUNTER
PT is coming for for elevated PSA  pt PSA is 4.62. pt stated that he is having frequency too but is taking medication that may be causing the frequency.

## 2024-02-27 NOTE — TELEPHONE ENCOUNTER
Called and left VM for the PT to call the Office back. PT schedule N/P appt throught [facility]. Office number left for the PT.    If PT calls back please Triage appt.

## 2024-02-29 NOTE — PROGRESS NOTES
"3/1/2024      Chief Complaint   Patient presents with    Elevated PSA    Follow-up         Assessment and Plan    64 y.o. male -- New patient    1. Elevated PSA  - Most recent PSA 4.62, previously 3.0  - CORINNE today declined  - Due to rising PSA and declining exam, I recommend MRI prostate for evaluation and prostate biopsy planning  - Return to review  - Call with any questions or concerns in the meantime  - All questions answered; patient understands and agrees with plan       History of Present Illness  Victor Hugo Bennett is a 64 y.o. male new patient here for evaluation of elevated PSA     Denies seeing urology in the past. Rising PSA. Most recent 4.62. Denies urinary symptoms, gross hematuria, family history of  malignancies. Patient is a self employed contractor.     Review of Systems   Constitutional:  Negative for activity change, appetite change, chills and fever.   HENT:  Negative for congestion and trouble swallowing.    Respiratory:  Negative for cough and shortness of breath.    Cardiovascular:  Negative for chest pain, palpitations and leg swelling.   Gastrointestinal:  Negative for abdominal pain, constipation, diarrhea, nausea and vomiting.   Genitourinary:  Negative for difficulty urinating, dysuria, flank pain, frequency, hematuria and urgency.   Musculoskeletal:  Negative for back pain and gait problem.   Skin:  Negative for wound.   Allergic/Immunologic: Negative for immunocompromised state.   Neurological:  Negative for dizziness and syncope.   Hematological:  Does not bruise/bleed easily.   Psychiatric/Behavioral:  Negative for confusion.    All other systems reviewed and are negative.      Vitals  Vitals:    03/01/24 0809   BP: 148/77   Pulse: 69   Resp: 18   Temp: 97.5 °F (36.4 °C)   TempSrc: Temporal   SpO2: 97%   Weight: 107 kg (236 lb 6.4 oz)   Height: 5' 11\" (1.803 m)       Physical Exam  Constitutional:       General: He is not in acute distress.     Appearance: Normal appearance. He is not " ill-appearing, toxic-appearing or diaphoretic.   HENT:      Head: Normocephalic.      Nose: No congestion.   Eyes:      General: No scleral icterus.        Right eye: No discharge.         Left eye: No discharge.      Conjunctiva/sclera: Conjunctivae normal.      Pupils: Pupils are equal, round, and reactive to light.   Pulmonary:      Effort: Pulmonary effort is normal.   Genitourinary:     Comments: CORINNE declined  Musculoskeletal:      Cervical back: Normal range of motion.   Skin:     General: Skin is warm and dry.      Coloration: Skin is not jaundiced or pale.      Findings: No bruising, erythema, lesion or rash.   Neurological:      General: No focal deficit present.      Mental Status: He is alert and oriented to person, place, and time. Mental status is at baseline.      Gait: Gait normal.   Psychiatric:         Mood and Affect: Mood normal.         Behavior: Behavior normal.         Thought Content: Thought content normal.         Judgment: Judgment normal.           Past History  Past Medical History:   Diagnosis Date    Hand pain     Headache     Hyperlipidemia     Neuropathy      Social History     Socioeconomic History    Marital status: /Civil Union     Spouse name: None    Number of children: None    Years of education: None    Highest education level: None   Occupational History    None   Tobacco Use    Smoking status: Some Days     Current packs/day: 0.00     Average packs/day: 1 pack/day for 14.0 years (14.0 ttl pk-yrs)     Types: Cigars, Cigarettes     Start date:      Last attempt to quit:      Years since quittin.1     Passive exposure: Past    Smokeless tobacco: Never   Vaping Use    Vaping status: Never Used   Substance and Sexual Activity    Alcohol use: Yes     Alcohol/week: 3.0 - 5.0 standard drinks of alcohol     Types: 3 - 5 Cans of beer per week     Comment: 2 beers a day    Drug use: Yes     Types: Marijuana     Comment: occasionally    Sexual activity: Not Currently      Partners: Female   Other Topics Concern    None   Social History Narrative    HETEROSEXUAL    H/O EMOTIONAL ABUSE    INADEQUATE ABUSE     Social Determinants of Health     Financial Resource Strain: Not on file   Food Insecurity: Not on file   Transportation Needs: Not on file   Physical Activity: Inactive (2019)    Exercise Vital Sign     Days of Exercise per Week: 0 days     Minutes of Exercise per Session: 0 min   Stress: Stress Concern Present (2019)    [employer] - Occupational Stress Questionnaire     Feeling of Stress : To some extent   Social Connections: Not on file   Intimate Partner Violence: Not on file   Housing Stability: Not on file     Social History     Tobacco Use   Smoking Status Some Days    Current packs/day: 0.00    Average packs/day: 1 pack/day for 14.0 years (14.0 ttl pk-yrs)    Types: Cigars, Cigarettes    Start date:     Last attempt to quit:     Years since quittin.1    Passive exposure: Past   Smokeless Tobacco Never     Family History   Problem Relation Age of Onset    Breast cancer Mother     ALS Father        The following portions of the patient's history were reviewed and updated as appropriate: allergies, current medications, past medical history, past social history, past surgical history and problem list.    Results  No results found for this or any previous visit (from the past 1 hour(s)).]  Lab Results   Component Value Date    PSA 4.62 (H) 2024    PSA 3.0 2022    PSA 1.2 2017     Lab Results   Component Value Date    CALCIUM 9.2 2024    K 3.7 2024    CO2 26 2024     2024    BUN 14 2024    CREATININE 0.81 2024     Lab Results   Component Value Date    WBC 6.80 2024    HGB 15.7 2024    HCT 46.6 2024    MCV 93 2024     2024       Maryjo Calderon PA-C

## 2024-03-01 ENCOUNTER — OFFICE VISIT (OUTPATIENT)
Dept: UROLOGY | Facility: CLINIC | Age: 64
End: 2024-03-01
Payer: COMMERCIAL

## 2024-03-01 VITALS
HEART RATE: 69 BPM | DIASTOLIC BLOOD PRESSURE: 77 MMHG | TEMPERATURE: 97.5 F | RESPIRATION RATE: 18 BRPM | WEIGHT: 236.4 LBS | OXYGEN SATURATION: 97 % | SYSTOLIC BLOOD PRESSURE: 148 MMHG | HEIGHT: 71 IN | BODY MASS INDEX: 33.1 KG/M2

## 2024-03-01 DIAGNOSIS — R97.20 ELEVATED PSA: ICD-10-CM

## 2024-03-01 PROCEDURE — 99204 OFFICE O/P NEW MOD 45 MIN: CPT | Performed by: PHYSICIAN ASSISTANT

## 2024-03-05 ENCOUNTER — APPOINTMENT (OUTPATIENT)
Dept: LAB | Facility: CLINIC | Age: 64
End: 2024-03-05
Payer: COMMERCIAL

## 2024-03-05 DIAGNOSIS — R97.20 ELEVATED PSA: ICD-10-CM

## 2024-03-05 LAB
ANION GAP SERPL CALCULATED.3IONS-SCNC: 7 MMOL/L
BUN SERPL-MCNC: 15 MG/DL (ref 5–25)
CALCIUM SERPL-MCNC: 8.8 MG/DL (ref 8.4–10.2)
CHLORIDE SERPL-SCNC: 107 MMOL/L (ref 96–108)
CO2 SERPL-SCNC: 24 MMOL/L (ref 21–32)
CREAT SERPL-MCNC: 0.9 MG/DL (ref 0.6–1.3)
GFR SERPL CREATININE-BSD FRML MDRD: 91 ML/MIN/1.73SQ M
GLUCOSE P FAST SERPL-MCNC: 80 MG/DL (ref 65–99)
POTASSIUM SERPL-SCNC: 3.7 MMOL/L (ref 3.5–5.3)
PSA SERPL-MCNC: 4.3 NG/ML (ref 0–4)
SODIUM SERPL-SCNC: 138 MMOL/L (ref 135–147)

## 2024-03-05 PROCEDURE — 80048 BASIC METABOLIC PNL TOTAL CA: CPT

## 2024-03-05 PROCEDURE — 36415 COLL VENOUS BLD VENIPUNCTURE: CPT

## 2024-03-05 PROCEDURE — 84153 ASSAY OF PSA TOTAL: CPT

## 2024-03-06 ENCOUNTER — TELEPHONE (OUTPATIENT)
Dept: UROLOGY | Facility: CLINIC | Age: 64
End: 2024-03-06

## 2024-03-06 NOTE — TELEPHONE ENCOUNTER
Per note attached to result: Patient was notified by -   Faith Abel   ---- Message from Maryjo Calderon PA-C sent at 3/6/2024  7:22 AM EST -----  PSA remains elevated. CORINNE was declined. Again I recommend MRI that was ordered. This isnt scheduled. Patient to have MRI done and follow up to review potential need for prostate biopsy. Thanks

## 2024-04-02 ENCOUNTER — HOSPITAL ENCOUNTER (OUTPATIENT)
Dept: RADIOLOGY | Age: 64
Discharge: HOME | End: 2024-04-02
Payer: COMMERCIAL

## 2024-04-02 DIAGNOSIS — R97.20 ELEVATED PSA: ICD-10-CM

## 2024-04-02 PROCEDURE — 76377 3D RENDER W/INTRP POSTPROCES: CPT

## 2024-04-02 PROCEDURE — 72197 MRI PELVIS W/O & W/DYE: CPT

## 2024-04-02 PROCEDURE — A9585 GADOBUTROL INJECTION: HCPCS | Performed by: PHYSICIAN ASSISTANT

## 2024-04-02 RX ORDER — GADOBUTROL 604.72 MG/ML
10 INJECTION INTRAVENOUS
Status: COMPLETED | OUTPATIENT
Start: 2024-04-02 | End: 2024-04-02

## 2024-04-02 RX ADMIN — GADOBUTROL 10 ML: 604.72 INJECTION INTRAVENOUS at 09:07

## 2024-04-16 ENCOUNTER — TELEPHONE (OUTPATIENT)
Dept: UROLOGY | Facility: MEDICAL CENTER | Age: 64
End: 2024-04-16

## 2024-04-16 NOTE — TELEPHONE ENCOUNTER
Clinical, patient will need a 2 week pathology review in the [place] office. None available, please contact the patient to arrange.

## 2024-04-16 NOTE — TELEPHONE ENCOUNTER
Fusion bx results appt scheduled at [place] office with Dr. Shepherd as there is no appropriate appointments in the timeframe needed up in . Please call and confirm. Please tell him that Dr. Shepherd services both offices so future follow ups could be up there

## 2024-04-16 NOTE — TELEPHONE ENCOUNTER
Called and left a Vm for pt with the bx results appt info. 6/26 at 11:30am in the [place] office with .    In message provided pt with information from previous note on this encounter.     Provided a call back number for the pt to call land confirm the appt.

## 2024-04-16 NOTE — TELEPHONE ENCOUNTER
Spoke with patient and confirmed surgery date of: 6/19/2024  Type of surgery:MRI Fusion BX  Operating physician: Dr. Basurto  Location of surgery: [facility]    Verbally went over prep with patient on: 4/16/2024  NPO  Bowel prep? Yes 1 enema 1 hour prior to leaving the house for the procedure  Hospital calls afternoon prior with arrival time -Calls Friday afternoon for Monday surgeries  Patient needs ride to and from surgery (outpatient/inpatient)   Pre-op testing to be done 2 weeks prior to surgery  CBC, CMP, Urine C&S, EKG  Blood thinners:   none  Clearances needed: none    Mailed/emailed to patient on: 4/16/2024  Copy of packet scanned into Media on: 4/16/2024  Labs in chart  Soap / Bowel prep instructions in packet  Pre-op & Post-op in packet  H&P on admit  post-op     Consent: on admit

## 2024-04-16 NOTE — TELEPHONE ENCOUNTER
"FYI if patient calls.    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

## 2024-04-18 NOTE — TELEPHONE ENCOUNTER
Called and spoke to pt and confirmed his BX follow up in [facility] with  ..    Pt confirmed

## 2025-01-07 DIAGNOSIS — I10 ESSENTIAL HYPERTENSION: ICD-10-CM

## 2025-01-08 ENCOUNTER — OFFICE VISIT (OUTPATIENT)
Dept: FAMILY MEDICINE CLINIC | Facility: CLINIC | Age: 65
End: 2025-01-08
Payer: COMMERCIAL

## 2025-01-08 ENCOUNTER — APPOINTMENT (OUTPATIENT)
Dept: LAB | Facility: CLINIC | Age: 65
End: 2025-01-08
Payer: COMMERCIAL

## 2025-01-08 VITALS
OXYGEN SATURATION: 98 % | HEIGHT: 71 IN | TEMPERATURE: 32 F | SYSTOLIC BLOOD PRESSURE: 182 MMHG | WEIGHT: 232.4 LBS | HEART RATE: 67 BPM | BODY MASS INDEX: 32.53 KG/M2 | DIASTOLIC BLOOD PRESSURE: 90 MMHG

## 2025-01-08 DIAGNOSIS — Z13.1 SCREENING FOR DIABETES MELLITUS: ICD-10-CM

## 2025-01-08 DIAGNOSIS — Z00.00 HEALTH MAINTENANCE EXAMINATION: Primary | ICD-10-CM

## 2025-01-08 DIAGNOSIS — R97.20 ELEVATED PSA: ICD-10-CM

## 2025-01-08 DIAGNOSIS — I10 ESSENTIAL HYPERTENSION: ICD-10-CM

## 2025-01-08 DIAGNOSIS — R53.83 OTHER FATIGUE: ICD-10-CM

## 2025-01-08 DIAGNOSIS — E78.2 COMBINED HYPERLIPIDEMIA: ICD-10-CM

## 2025-01-08 PROBLEM — R07.81 RIB PAIN ON RIGHT SIDE: Status: RESOLVED | Noted: 2024-01-05 | Resolved: 2025-01-08

## 2025-01-08 PROBLEM — M79.10 MYALGIA: Status: RESOLVED | Noted: 2019-08-13 | Resolved: 2025-01-08

## 2025-01-08 LAB
ALBUMIN SERPL BCG-MCNC: 4.4 G/DL (ref 3.5–5)
ALP SERPL-CCNC: 72 U/L (ref 34–104)
ALT SERPL W P-5'-P-CCNC: 29 U/L (ref 7–52)
ANION GAP SERPL CALCULATED.3IONS-SCNC: 9 MMOL/L (ref 4–13)
AST SERPL W P-5'-P-CCNC: 24 U/L (ref 13–39)
BASOPHILS # BLD AUTO: 0.05 THOUSANDS/ΜL (ref 0–0.1)
BASOPHILS NFR BLD AUTO: 1 % (ref 0–1)
BILIRUB SERPL-MCNC: 0.7 MG/DL (ref 0.2–1)
BUN SERPL-MCNC: 10 MG/DL (ref 5–25)
CALCIUM SERPL-MCNC: 9.2 MG/DL (ref 8.4–10.2)
CHLORIDE SERPL-SCNC: 103 MMOL/L (ref 96–108)
CHOLEST SERPL-MCNC: 221 MG/DL (ref ?–200)
CO2 SERPL-SCNC: 27 MMOL/L (ref 21–32)
CREAT SERPL-MCNC: 0.73 MG/DL (ref 0.6–1.3)
EOSINOPHIL # BLD AUTO: 0.26 THOUSAND/ΜL (ref 0–0.61)
EOSINOPHIL NFR BLD AUTO: 4 % (ref 0–6)
ERYTHROCYTE [DISTWIDTH] IN BLOOD BY AUTOMATED COUNT: 12.6 % (ref 11.6–15.1)
GFR SERPL CREATININE-BSD FRML MDRD: 98 ML/MIN/1.73SQ M
GLUCOSE P FAST SERPL-MCNC: 87 MG/DL (ref 65–99)
HCT VFR BLD AUTO: 47.4 % (ref 36.5–49.3)
HDLC SERPL-MCNC: 37 MG/DL
HGB BLD-MCNC: 15.8 G/DL (ref 12–17)
IMM GRANULOCYTES # BLD AUTO: 0.03 THOUSAND/UL (ref 0–0.2)
IMM GRANULOCYTES NFR BLD AUTO: 0 % (ref 0–2)
LDLC SERPL CALC-MCNC: 161 MG/DL (ref 0–100)
LYMPHOCYTES # BLD AUTO: 1.83 THOUSANDS/ΜL (ref 0.6–4.47)
LYMPHOCYTES NFR BLD AUTO: 27 % (ref 14–44)
MCH RBC QN AUTO: 31 PG (ref 26.8–34.3)
MCHC RBC AUTO-ENTMCNC: 33.3 G/DL (ref 31.4–37.4)
MCV RBC AUTO: 93 FL (ref 82–98)
MONOCYTES # BLD AUTO: 0.7 THOUSAND/ΜL (ref 0.17–1.22)
MONOCYTES NFR BLD AUTO: 10 % (ref 4–12)
NEUTROPHILS # BLD AUTO: 3.98 THOUSANDS/ΜL (ref 1.85–7.62)
NEUTS SEG NFR BLD AUTO: 58 % (ref 43–75)
NONHDLC SERPL-MCNC: 184 MG/DL
NRBC BLD AUTO-RTO: 0 /100 WBCS
PLATELET # BLD AUTO: 247 THOUSANDS/UL (ref 149–390)
PMV BLD AUTO: 10.5 FL (ref 8.9–12.7)
POTASSIUM SERPL-SCNC: 3.7 MMOL/L (ref 3.5–5.3)
PROT SERPL-MCNC: 7 G/DL (ref 6.4–8.4)
PSA SERPL-MCNC: 4.27 NG/ML (ref 0–4)
RBC # BLD AUTO: 5.1 MILLION/UL (ref 3.88–5.62)
SODIUM SERPL-SCNC: 139 MMOL/L (ref 135–147)
TRIGL SERPL-MCNC: 116 MG/DL (ref ?–150)
WBC # BLD AUTO: 6.85 THOUSAND/UL (ref 4.31–10.16)

## 2025-01-08 PROCEDURE — 84153 ASSAY OF PSA TOTAL: CPT

## 2025-01-08 PROCEDURE — 85025 COMPLETE CBC W/AUTO DIFF WBC: CPT

## 2025-01-08 PROCEDURE — 80053 COMPREHEN METABOLIC PANEL: CPT

## 2025-01-08 PROCEDURE — 80061 LIPID PANEL: CPT

## 2025-01-08 PROCEDURE — 99396 PREV VISIT EST AGE 40-64: CPT | Performed by: FAMILY MEDICINE

## 2025-01-08 PROCEDURE — 36415 COLL VENOUS BLD VENIPUNCTURE: CPT

## 2025-01-08 NOTE — PROGRESS NOTES
"Name: Victor Hugo Bennett      : 1960      MRN: [id number]  Encounter Provider: Baldomero Pizarro MD  Encounter Date: 2025   Encounter department: [facility]  :  Assessment & Plan  Health maintenance examination  Normal exam       Essential hypertension  Elevated advise to measure BP daily  If elevated above 140/90 mmhg needs medications adjusted       Elevated PSA    Orders:  •  PSA Total, Diagnostic; Future    If elevated again needs to follow up with Urology  Combined hyperlipidemia    Orders:  •  Lipid panel; Future    Screening for diabetes mellitus    Orders:  •  Comprehensive metabolic panel; Future    Other fatigue    Orders:  •  CBC and differential; Future    Follow up in 1 year or before if elevated BP       History of Present Illness     Patient is here for a yearly physical.  He has elevated blood pressure today. Did not take his medications today. He had some stress coming in today.  Had elevated PSA last year and had an MRI of the prostate which was equivocal.  He denies any urinary symptoms.          Review of Systems   Constitutional:  Negative for activity change, appetite change, fatigue and fever.   HENT:  Negative for congestion and ear discharge.    Respiratory:  Negative for cough and shortness of breath.    Cardiovascular:  Negative for chest pain and palpitations.   Gastrointestinal:  Negative for diarrhea and nausea.   Musculoskeletal:  Negative for arthralgias and back pain.   Skin:  Negative for color change and rash.   Neurological:  Negative for dizziness and headaches.   Psychiatric/Behavioral:  Negative for agitation and behavioral problems.        Objective   BP (!) 182/90   Pulse 67   Temp (!) 32 °F (0 °C)   Ht 5' 11\" (1.803 m)   Wt 105 kg (232 lb 6.4 oz)   SpO2 98%   BMI 32.41 kg/m²      Physical Exam  Constitutional:       General: He is not in acute distress.     Appearance: He is well-developed. He is not diaphoretic.   Eyes:      General: No " scleral icterus.     Pupils: Pupils are equal, round, and reactive to light.   Cardiovascular:      Rate and Rhythm: Normal rate and regular rhythm.      Heart sounds: Normal heart sounds. No murmur heard.  Pulmonary:      Effort: Pulmonary effort is normal. No respiratory distress.      Breath sounds: Normal breath sounds. No wheezing.   Abdominal:      General: Bowel sounds are normal. There is no distension.      Palpations: Abdomen is soft.      Tenderness: There is no abdominal tenderness.   Skin:     General: Skin is warm and dry.      Findings: No rash.   Neurological:      Mental Status: He is alert and oriented to person, place, and time.

## 2025-01-09 ENCOUNTER — RESULTS FOLLOW-UP (OUTPATIENT)
Dept: FAMILY MEDICINE CLINIC | Facility: CLINIC | Age: 65
End: 2025-01-09

## 2025-01-09 DIAGNOSIS — E78.5 HYPERLIPIDEMIA, UNSPECIFIED HYPERLIPIDEMIA TYPE: Primary | ICD-10-CM

## 2025-01-09 RX ORDER — ATORVASTATIN CALCIUM 20 MG/1
20 TABLET, FILM COATED ORAL DAILY
Qty: 100 TABLET | Refills: 3 | Status: SHIPPED | OUTPATIENT
Start: 2025-01-09

## 2025-01-09 RX ORDER — VALSARTAN AND HYDROCHLOROTHIAZIDE 320; 25 MG/1; MG/1
1 TABLET, FILM COATED ORAL DAILY
Qty: 90 TABLET | Refills: 1 | Status: SHIPPED | OUTPATIENT
Start: 2025-01-09

## 2025-02-07 PROBLEM — Z00.00 HEALTH MAINTENANCE EXAMINATION: Status: RESOLVED | Noted: 2025-01-08 | Resolved: 2025-02-07

## 2025-07-04 DIAGNOSIS — I10 ESSENTIAL HYPERTENSION: ICD-10-CM

## 2025-07-06 RX ORDER — VALSARTAN AND HYDROCHLOROTHIAZIDE 320; 25 MG/1; MG/1
1 TABLET, FILM COATED ORAL DAILY
Qty: 90 TABLET | Refills: 1 | Status: SHIPPED | OUTPATIENT
Start: 2025-07-06